# Patient Record
Sex: FEMALE | Race: WHITE | HISPANIC OR LATINO | Employment: UNEMPLOYED | URBAN - METROPOLITAN AREA
[De-identification: names, ages, dates, MRNs, and addresses within clinical notes are randomized per-mention and may not be internally consistent; named-entity substitution may affect disease eponyms.]

---

## 2017-11-24 ENCOUNTER — CONVERSION ENCOUNTER (OUTPATIENT)
Dept: MAMMOGRAPHY | Facility: CLINIC | Age: 57
End: 2017-11-24

## 2018-08-23 PROBLEM — K76.0 STEATOSIS OF LIVER: Status: ACTIVE | Noted: 2017-12-13

## 2018-08-27 ENCOUNTER — OFFICE VISIT (OUTPATIENT)
Dept: FAMILY MEDICINE CLINIC | Facility: CLINIC | Age: 58
End: 2018-08-27
Payer: COMMERCIAL

## 2018-08-27 VITALS
OXYGEN SATURATION: 96 % | HEART RATE: 79 BPM | TEMPERATURE: 99.3 F | SYSTOLIC BLOOD PRESSURE: 110 MMHG | HEIGHT: 62 IN | WEIGHT: 233 LBS | BODY MASS INDEX: 42.88 KG/M2 | RESPIRATION RATE: 16 BRPM | DIASTOLIC BLOOD PRESSURE: 70 MMHG

## 2018-08-27 DIAGNOSIS — R73.01 IMPAIRED FASTING GLUCOSE: ICD-10-CM
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
## 2018-08-27 DIAGNOSIS — E03.9 ACQUIRED HYPOTHYROIDISM: ICD-10-CM

## 2018-08-27 DIAGNOSIS — M25.561 ACUTE PAIN OF BOTH KNEES: ICD-10-CM

## 2018-08-27 DIAGNOSIS — M25.562 ACUTE PAIN OF BOTH KNEES: ICD-10-CM

## 2018-08-27 DIAGNOSIS — E55.9 VITAMIN D DEFICIENCY: ICD-10-CM

## 2018-08-27 DIAGNOSIS — M54.50 ACUTE MIDLINE LOW BACK PAIN WITHOUT SCIATICA: ICD-10-CM

## 2018-08-27 DIAGNOSIS — I10 ESSENTIAL HYPERTENSION: ICD-10-CM

## 2018-08-27 PROCEDURE — 99214 OFFICE O/P EST MOD 30 MIN: CPT | Performed by: FAMILY MEDICINE

## 2018-08-27 RX ORDER — MONTELUKAST SODIUM 10 MG/1
TABLET ORAL
COMMUNITY
Start: 2016-03-15 | End: 2019-02-21 | Stop reason: SDUPTHER

## 2018-08-27 RX ORDER — FUROSEMIDE 20 MG/1
20 TABLET ORAL DAILY
Refills: 1 | COMMUNITY
Start: 2018-05-20 | End: 2019-02-21 | Stop reason: SDUPTHER

## 2018-08-27 RX ORDER — CITALOPRAM 10 MG/1
TABLET ORAL
COMMUNITY
Start: 2016-03-28 | End: 2019-06-17 | Stop reason: SDUPTHER

## 2018-08-27 RX ORDER — FLUTICASONE PROPIONATE 50 MCG
SPRAY, SUSPENSION (ML) NASAL AS NEEDED
COMMUNITY
Start: 2016-03-31

## 2018-08-27 RX ORDER — ATORVASTATIN CALCIUM 20 MG/1
TABLET, FILM COATED ORAL
COMMUNITY
Start: 2016-03-21 | End: 2019-03-28 | Stop reason: SDUPTHER

## 2018-08-27 RX ORDER — LEVOTHYROXINE SODIUM 112 UG/1
TABLET ORAL
COMMUNITY
Start: 2011-07-18 | End: 2019-05-20 | Stop reason: SDUPTHER

## 2018-08-27 RX ORDER — FEXOFENADINE HCL 180 MG/1
TABLET ORAL
COMMUNITY
Start: 2015-09-28

## 2018-08-27 RX ORDER — AMLODIPINE BESYLATE AND OLMESARTAN MEDOXOMIL 5; 40 MG/1; MG/1
1 TABLET, FILM COATED ORAL DAILY
Refills: 1 | COMMUNITY
Start: 2018-05-26 | End: 2019-03-19 | Stop reason: SDUPTHER

## 2018-08-27 NOTE — PROGRESS NOTES
Assessment/Plan:    No problem-specific Assessment & Plan notes found for this encounter  Diagnoses and all orders for this visit:    Fall, initial encounter  Comments:  Mechanical fall the plan for CT scan of the brain without contrast discussed with the patient  Orders:  -     CBC and differential; Future  -     Comprehensive metabolic panel; Future  -     Hemoglobin A1C; Future  -     Lipid Panel with Direct LDL reflex; Future  -     TSH, 3rd generation with Free T4 reflex; Future  -     Vitamin D 25 hydroxy; Future  -     CT head wo contrast; Future    Acute midline low back pain without sciatica  Comments:  Status post fall plan for x-ray of the lumbar spine a Tylenol for the pain and we discussed with the patient important lose weight improper postural  Orders:  -     CBC and differential; Future  -     Comprehensive metabolic panel; Future  -     Hemoglobin A1C; Future  -     Lipid Panel with Direct LDL reflex; Future  -     TSH, 3rd generation with Free T4 reflex; Future  -     Vitamin D 25 hydroxy; Future  -     XR spine lumbar 2 or 3 views injury; Future    Acute pain of both knees  Comments:  S/P fall ,plan for Xray of B/L knee ,Tylenol for pain ,disucss importasnt lose weight  Orders:  -     CBC and differential; Future  -     Comprehensive metabolic panel; Future  -     Hemoglobin A1C; Future  -     Lipid Panel with Direct LDL reflex; Future  -     TSH, 3rd generation with Free T4 reflex; Future  -     Vitamin D 25 hydroxy; Future  -     XR knee 4+ vw right injury; Future  -     XR knee 4+ vw left injury; Future    Acquired hypothyroidism  -     CBC and differential; Future  -     Comprehensive metabolic panel; Future  -     Hemoglobin A1C; Future  -     Lipid Panel with Direct LDL reflex; Future  -     TSH, 3rd generation with Free T4 reflex; Future  -     Vitamin D 25 hydroxy; Future    Impaired fasting glucose  -     CBC and differential; Future  -     Comprehensive metabolic panel;  Future  - Hemoglobin A1C; Future  -     Lipid Panel with Direct LDL reflex; Future  -     TSH, 3rd generation with Free T4 reflex; Future  -     Vitamin D 25 hydroxy; Future    Essential hypertension  -     CBC and differential; Future  -     Comprehensive metabolic panel; Future  -     Hemoglobin A1C; Future  -     Lipid Panel with Direct LDL reflex; Future  -     TSH, 3rd generation with Free T4 reflex; Future  -     Vitamin D 25 hydroxy; Future    Vitamin D deficiency  -     Vitamin D 25 hydroxy; Future    Other orders  -     fexofenadine (ALLEGRA ALLERGY) 180 MG tablet; take 1 tablet (180MG)  by oral route  every day  -     ROSANA 5-40 MG; Take 1 tablet by mouth daily  -     atorvastatin (LIPITOR) 20 mg tablet; TAKE 1 TABLET BY MOUTH EVERY DAY  -     citalopram (CeleXA) 10 mg tablet; TAKE 1 TABLET BY MOUTH EVERY EVENING  -     fluticasone (FLONASE) 50 mcg/act nasal spray; as needed  -     furosemide (LASIX) 20 mg tablet; Take 20 mg by mouth daily  -     levothyroxine 112 mcg tablet;   -     montelukast (SINGULAIR) 10 mg tablet; TAKE 1 TABLET IN THE EVENING          Subjective:   Chief Complaint   Patient presents with    Follow-up     chronic conditions     Fall     4 days ago         Patient ID: Mervat Boudreaux is a 65 y o  female      The 4 days ago patient was coming back from [place] and in the airport she tripped and fell forward she had her knee on floor , the per patient she does not recall if she hit her head or not and the but she have a since then she had pain all over her body on her back and her knees and her lower extremity and she feel her she has a week and the tired all the time on and off headache no blurred vision no weakness no lateralized of the symptom and no numbness that she did had some swelling and discoloration and bilateral knee no vomiting she has been taking some Advil over-the-counter for her pain        The following portions of the patient's history were reviewed and updated as appropriate: allergies, current medications, past family history, past medical history, past social history, past surgical history and problem list     Review of Systems   Constitutional: Negative for fatigue and fever  HENT: Negative for ear pain, sinus pain, sinus pressure and sore throat  Eyes: Negative for pain and redness  Respiratory: Negative for cough, chest tightness and shortness of breath  Cardiovascular: Negative for chest pain, palpitations and leg swelling  Gastrointestinal: Negative for abdominal pain, blood in stool, constipation, diarrhea and nausea  Genitourinary: Negative for flank pain, frequency and hematuria  Musculoskeletal: Positive for arthralgias and back pain  Negative for joint swelling  Skin: Negative for rash  Neurological: Positive for headaches  Negative for dizziness and numbness  Hematological: Does not bruise/bleed easily  Objective:  Vitals:    08/27/18 1002   BP: 110/70   BP Location: Left arm   Patient Position: Sitting   Cuff Size: Large   Pulse: 79   Resp: 16   Temp: 99 3 °F (37 4 °C)   TempSrc: Oral   SpO2: 96%   Weight: 106 kg (233 lb)   Height: 5' 2" (1 575 m)      Physical Exam   Constitutional: She is oriented to person, place, and time  She appears well-developed and well-nourished  HENT:   Head: Normocephalic  Right Ear: External ear normal    Left Ear: External ear normal    Eyes: Conjunctivae and EOM are normal  Right eye exhibits no discharge  Left eye exhibits no discharge  Neck: No JVD present  Cardiovascular: Normal rate, regular rhythm and normal heart sounds  Exam reveals no gallop  No murmur heard  Pulmonary/Chest: Effort normal  No respiratory distress  She has no wheezes  She has no rales  She exhibits no tenderness  Abdominal: She exhibits no mass  There is no tenderness  There is no rebound  Musculoskeletal: She exhibits tenderness  She exhibits no edema  Neurological: She is alert and oriented to person, place, and time  Skin: No rash noted  No erythema

## 2018-09-04 ENCOUNTER — HOSPITAL ENCOUNTER (OUTPATIENT)
Dept: CT IMAGING | Facility: HOSPITAL | Age: 58
Discharge: HOME | End: 2018-09-04
Payer: COMMERCIAL

## 2018-09-04 ENCOUNTER — HOSPITAL ENCOUNTER (OUTPATIENT)
Dept: RADIOLOGY | Facility: HOSPITAL | Age: 58
Discharge: HOME | End: 2018-09-04
Payer: COMMERCIAL
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
## 2018-09-04 DIAGNOSIS — M54.50 ACUTE MIDLINE LOW BACK PAIN WITHOUT SCIATICA: ICD-10-CM

## 2018-09-04 DIAGNOSIS — M25.561 ACUTE PAIN OF BOTH KNEES: ICD-10-CM

## 2018-09-04 DIAGNOSIS — M25.562 ACUTE PAIN OF BOTH KNEES: ICD-10-CM

## 2018-09-04 PROCEDURE — 73564 X-RAY EXAM KNEE 4 OR MORE: CPT

## 2018-09-04 PROCEDURE — 70450 CT HEAD/BRAIN W/O DYE: CPT

## 2018-09-04 PROCEDURE — 72110 X-RAY EXAM L-2 SPINE 4/>VWS: CPT

## 2018-09-18 ENCOUNTER — OFFICE VISIT (OUTPATIENT)
Dept: FAMILY MEDICINE CLINIC | Facility: CLINIC | Age: 58
End: 2018-09-18
Payer: COMMERCIAL

## 2018-09-18 VITALS
HEART RATE: 82 BPM | TEMPERATURE: 99.2 F | OXYGEN SATURATION: 95 % | WEIGHT: 235 LBS | SYSTOLIC BLOOD PRESSURE: 124 MMHG | BODY MASS INDEX: 43.24 KG/M2 | DIASTOLIC BLOOD PRESSURE: 80 MMHG | HEIGHT: 62 IN

## 2018-09-18 DIAGNOSIS — E03.9 ACQUIRED HYPOTHYROIDISM: Primary | ICD-10-CM

## 2018-09-18 DIAGNOSIS — R74.8 ALKALINE PHOSPHATASE ELEVATION: ICD-10-CM

## 2018-09-18 DIAGNOSIS — E55.9 VITAMIN D DEFICIENCY: ICD-10-CM

## 2018-09-18 DIAGNOSIS — R73.01 IMPAIRED FASTING GLUCOSE: ICD-10-CM

## 2018-09-18 DIAGNOSIS — M54.42 CHRONIC BILATERAL LOW BACK PAIN WITH BILATERAL SCIATICA: ICD-10-CM

## 2018-09-18 DIAGNOSIS — E78.2 MIXED HYPERLIPIDEMIA: ICD-10-CM

## 2018-09-18 DIAGNOSIS — I10 ESSENTIAL HYPERTENSION: ICD-10-CM

## 2018-09-18 DIAGNOSIS — M54.41 CHRONIC BILATERAL LOW BACK PAIN WITH BILATERAL SCIATICA: ICD-10-CM

## 2018-09-18 DIAGNOSIS — G89.29 CHRONIC BILATERAL LOW BACK PAIN WITH BILATERAL SCIATICA: ICD-10-CM

## 2018-09-18 DIAGNOSIS — K21.9 GASTROESOPHAGEAL REFLUX DISEASE WITHOUT ESOPHAGITIS: ICD-10-CM

## 2018-09-18 PROCEDURE — 3074F SYST BP LT 130 MM HG: CPT | Performed by: FAMILY MEDICINE

## 2018-09-18 PROCEDURE — 99214 OFFICE O/P EST MOD 30 MIN: CPT | Performed by: FAMILY MEDICINE

## 2018-09-18 PROCEDURE — 3079F DIAST BP 80-89 MM HG: CPT | Performed by: FAMILY MEDICINE

## 2018-09-18 PROCEDURE — 3008F BODY MASS INDEX DOCD: CPT | Performed by: FAMILY MEDICINE

## 2018-09-18 RX ORDER — IBUPROFEN 600 MG/1
600 TABLET ORAL EVERY 8 HOURS PRN
Qty: 30 TABLET | Refills: 0 | Status: SHIPPED | OUTPATIENT
Start: 2018-09-18 | End: 2018-10-19 | Stop reason: SDUPTHER

## 2018-09-18 RX ORDER — ERGOCALCIFEROL 1.25 MG/1
50000 CAPSULE ORAL WEEKLY
Qty: 4 CAPSULE | Refills: 2 | Status: SHIPPED | OUTPATIENT
Start: 2018-09-18 | End: 2019-01-09 | Stop reason: ALTCHOICE

## 2018-09-18 RX ORDER — PANTOPRAZOLE SODIUM 40 MG/1
40 TABLET, DELAYED RELEASE ORAL DAILY
Qty: 30 TABLET | Refills: 1 | Status: SHIPPED | OUTPATIENT
Start: 2018-09-18 | End: 2018-11-05 | Stop reason: SDUPTHER

## 2018-09-18 NOTE — PATIENT INSTRUCTIONS
Gastroesophageal Reflux Disease   AMBULATORY CARE:   Gastroesophageal reflux  reflux occurs when acid and food in the stomach back up into the esophagus  Gastroesophageal reflux disease (GERD) is reflux that occurs more than twice a week for a few weeks  It usually causes heartburn and other symptoms  GERD can cause other health problems over time if it is not treated  Common symptoms include:  Heartburn is the most common symptom of GERD  You may feel burning pain in your chest or below the breast bone  This usually occurs after meals and spreads to your neck, jaw, or shoulder  The pain gets better when you change positions  You may also have any of the following:  · Bitter or acid taste in your mouth    · Dry cough    · Trouble swallowing or pain with swallowing    · Hoarseness or sore throat    · Frequent burping or hiccups    · Feeling of fullness soon after you start eating  Seek care immediately if:  · You feel full and cannot burp or vomit  · You have severe chest pain and sudden trouble breathing  · Your bowel movements are black, bloody, or tarry-looking  · Your vomit looks like coffee grounds or has blood in it  Contact your healthcare provider if:   · You vomit large amounts, or you vomit often  · You have trouble breathing after you vomit  · You have trouble swallowing, or pain with swallowing  · You are losing weight without trying  · Your symptoms get worse or do not improve with treatment  · You have questions or concerns about your condition or care  Treatment for GERD:  Your healthcare provider may prescribe medicine to decrease stomach acid  He may also prescribe medicine that help your esophagus and stomach move food and liquid to your intestines  Surgery may be done if other treatments do not work  You may need surgery to wrap the upper part of the stomach around the esophageal sphincter  This will strengthen the sphincter and prevent reflux     Manage GERD: · Do not have foods or drinks that may increase heartburn  These include chocolate, peppermint, fried or fatty foods, drinks that contain caffeine, or carbonated drinks (soda)  Other foods include spicy foods, onions, tomatoes, and tomato-based foods  Do not have foods or drinks that can irritate your esophagus, such as citrus fruits, juices, and alcohol  · Do not eat large meals  When you eat a lot of food at one time, your stomach needs more acid to digest it  Eat 6 small meals each day instead of 3 large ones, and eat slowly  Do not eat meals 2 to 3 hours before bedtime  · Elevate the head of your bed  Place 6-inch blocks under the head of your bed frame  You may also use more than one pillow under your head and shoulders while you sleep  · Maintain a healthy weight  If you are overweight, weight loss may help relieve symptoms of GERD  · Do not smoke  Smoking weakens the lower esophageal sphincter and increases the risk of GERD  Ask your healthcare provider for information if you currently smoke and need help to quit  E-cigarettes or smokeless tobacco still contain nicotine  Talk to your healthcare provider before you use these products  · Do not wear clothing that is tight around your waist   Tight clothing can put pressure on your stomach and cause or worsen GERD symptoms  Follow up with your healthcare provider as directed:  Write down your questions so you remember to ask them during your visits  © 2017 [employer] Information is for End User's use only and may not be sold, redistributed or otherwise used for commercial purposes  All illustrations and images included in CareNotes® are the copyrighted property of [employer]  or [employer]  The above information is an  only  It is not intended as medical advice for individual conditions or treatments   Talk to your doctor, nurse or pharmacist before following any medical regimen to see if it is safe and effective for you

## 2018-09-20 NOTE — ASSESSMENT & PLAN NOTE
The elevated again phosphatase weaken recheck LFT within do ultrasound of the liver will refer the patient to see GI

## 2018-09-20 NOTE — PROGRESS NOTES
Assessment/Plan:    Gastroesophageal reflux disease  Chronic ,well controlled by Pantoprazole  Discuss with pt important lose weight   Multiple small meal ,avoid eat and lying down ,avoid spicy food and avoid provoked food        Hypothyroidism  Chronic fair controlled with the current dose of levothyroxine 112 mcg once a day proper use of medication discussed with the patient    Essential hypertension  Chronic with controlled continue current medication low-salt diet less than 2 g a day ,   low caffeine intake   regular aerobic exercise 20 to totally minute a day diet and important lose weight discussed with the patient      Impaired fasting glucose  Chronic for control with the low carb diet encouraged patient to continue low carb diet  Discussed with the patient important lose weight    Mixed hyperlipidemia  Chronic ,fair control on current medication  Advised to maintain a low-fat low-cholesterol diet consult regarding potential comorbidity including cardiovascular disease consult regarding important weight loss      Vitamin D deficiency  Uncontrolled start vitamin-D 13415 International Units once a week for 12 week proper use of medication possible side effect discussed with the patient    Alkaline phosphatase elevation  The elevated again phosphatase weaken recheck LFT within do ultrasound of the liver will refer the patient to see GI       Diagnoses and all orders for this visit:    Acquired hypothyroidism    Impaired fasting glucose    Essential hypertension    Mixed hyperlipidemia    Vitamin D deficiency  -     ergocalciferol (VITAMIN D2) 50,000 units; Take 1 capsule (50,000 Units total) by mouth once a week    Gastroesophageal reflux disease without esophagitis  -     pantoprazole (PROTONIX) 40 mg tablet; Take 1 tablet (40 mg total) by mouth daily    Alkaline phosphatase elevation  -     US liver; Future  -     Ambulatory referral to Gastroenterology;  Future    Chronic bilateral low back pain with bilateral sciatica  -     ibuprofen (MOTRIN) 600 mg tablet; Take 1 tablet (600 mg total) by mouth every 8 (eight) hours as needed for mild pain          Subjective:   Chief Complaint   Patient presents with    Follow-up     chronic conditions        Patient ID: Emely Crespo is a 63 y o  female  Patient here follow-up with a chronic condition The patient has long history of hypertension the blood pressure today is in control patient tolerates medication well and no chest pain or short of breath no palpitation no headache patient's history of hypothyroidism asymptomatic tolerated current dose of levothyroxine 112 mcg patient's history of hyperlipidemia on statin tolerated well without any side effect also patient was history of for GERD on pantoprazole 40 mg once a day patient feel and still symptomatic bloating after eating any kind of food with heartburn but there is no vomiting no diarrhea no weight change patient has been taking the PPI for a long time  Vitamin-D deficiency is uncontrolled asymptomatic  Her recent blood work discussed with the patient show her upcoming phosphatases elevated        The following portions of the patient's history were reviewed and updated as appropriate: allergies, current medications, past family history, past medical history, past social history, past surgical history and problem list     Review of Systems   Constitutional: Negative for fatigue and fever  HENT: Negative for ear pain, sinus pain, sinus pressure and sore throat  Eyes: Negative for pain and redness  Respiratory: Negative for cough, chest tightness and shortness of breath  Cardiovascular: Negative for chest pain, palpitations and leg swelling  Gastrointestinal: Negative for abdominal pain, blood in stool, constipation, diarrhea and nausea  Genitourinary: Negative for flank pain, frequency and hematuria  Musculoskeletal: Negative for back pain and joint swelling  Skin: Negative for rash  Neurological: Negative for dizziness, numbness and headaches  Hematological: Does not bruise/bleed easily  Objective:  Vitals:    09/18/18 1113   BP: 124/80   Pulse: 82   Temp: 99 2 °F (37 3 °C)   TempSrc: Oral   SpO2: 95%   Weight: 107 kg (235 lb)   Height: 5' 2 25" (1 581 m)      Physical Exam   Constitutional: She is oriented to person, place, and time  She appears well-developed and well-nourished  HENT:   Head: Normocephalic  Right Ear: External ear normal    Left Ear: External ear normal    Eyes: Conjunctivae and EOM are normal  Right eye exhibits no discharge  Left eye exhibits no discharge  Neck: No JVD present  Cardiovascular: Normal rate and regular rhythm  Exam reveals no gallop  Murmur heard  Pulmonary/Chest: Effort normal  No respiratory distress  She has no wheezes  She has no rales  She exhibits no tenderness  Abdominal: She exhibits no mass  There is no tenderness  There is no rebound  Musculoskeletal: She exhibits no edema or tenderness  Neurological: She is alert and oriented to person, place, and time  Skin: No rash noted  No erythema

## 2018-09-20 NOTE — ASSESSMENT & PLAN NOTE
Chronic fair controlled with the current dose of levothyroxine 112 mcg once a day proper use of medication discussed with the patient

## 2018-09-20 NOTE — ASSESSMENT & PLAN NOTE
Uncontrolled start vitamin-D 12798 International Units once a week for 12 week proper use of medication possible side effect discussed with the patient

## 2018-09-20 NOTE — ASSESSMENT & PLAN NOTE
Chronic for control with the low carb diet encouraged patient to continue low carb diet  Discussed with the patient important lose weight

## 2018-09-24 ENCOUNTER — HOSPITAL ENCOUNTER (OUTPATIENT)
Dept: ULTRASOUND IMAGING | Facility: HOSPITAL | Age: 58
Discharge: HOME | End: 2018-09-24
Payer: COMMERCIAL

## 2018-09-24 DIAGNOSIS — R74.8 ALKALINE PHOSPHATASE ELEVATION: ICD-10-CM

## 2018-09-24 PROCEDURE — 76705 ECHO EXAM OF ABDOMEN: CPT

## 2018-10-19 ENCOUNTER — OFFICE VISIT (OUTPATIENT)
Dept: FAMILY MEDICINE CLINIC | Facility: CLINIC | Age: 58
End: 2018-10-19
Payer: COMMERCIAL

## 2018-10-19 VITALS
HEART RATE: 74 BPM | BODY MASS INDEX: 43.61 KG/M2 | TEMPERATURE: 98.1 F | DIASTOLIC BLOOD PRESSURE: 88 MMHG | OXYGEN SATURATION: 95 % | WEIGHT: 237 LBS | SYSTOLIC BLOOD PRESSURE: 142 MMHG | HEIGHT: 62 IN | RESPIRATION RATE: 16 BRPM

## 2018-10-19 DIAGNOSIS — K76.0 FATTY LIVER DISEASE, NONALCOHOLIC: Primary | ICD-10-CM

## 2018-10-19 DIAGNOSIS — G89.29 CHRONIC BILATERAL LOW BACK PAIN WITH BILATERAL SCIATICA: ICD-10-CM

## 2018-10-19 DIAGNOSIS — M54.42 CHRONIC BILATERAL LOW BACK PAIN WITH BILATERAL SCIATICA: ICD-10-CM

## 2018-10-19 DIAGNOSIS — Z23 NEED FOR INFLUENZA VACCINATION: ICD-10-CM

## 2018-10-19 DIAGNOSIS — E66.01 MORBID OBESITY (HCC): ICD-10-CM

## 2018-10-19 DIAGNOSIS — M54.41 CHRONIC BILATERAL LOW BACK PAIN WITH BILATERAL SCIATICA: ICD-10-CM

## 2018-10-19 PROCEDURE — 99213 OFFICE O/P EST LOW 20 MIN: CPT | Performed by: FAMILY MEDICINE

## 2018-10-19 PROCEDURE — 3008F BODY MASS INDEX DOCD: CPT | Performed by: FAMILY MEDICINE

## 2018-10-19 RX ORDER — IBUPROFEN 600 MG/1
600 TABLET ORAL EVERY 8 HOURS PRN
Qty: 30 TABLET | Refills: 0 | Status: SHIPPED | OUTPATIENT
Start: 2018-10-19 | End: 2019-01-09 | Stop reason: SDUPTHER

## 2018-10-19 NOTE — PROGRESS NOTES
Subjective:   Chief Complaint   Patient presents with    Follow-up     review Ultrasound of the liver         Patient ID: Tracy Phillips is a 63 y o  female  Patient he discussed ultrasound of the liver, which showed she had a fatty liver result discussed with the patient discussed with the also the nodule of the problem and important lose weight  Patient having problem with her weight is she is in and morbid obesity category patient has been trying to lose weight with multiple trial of diet and she was not successful patient does not do exercise regularly secondary to some back pain deny any and skin discoloration no muscle atrophy no excessive hair growth no sweating no fatigue no sleep disorder        The following portions of the patient's history were reviewed and updated as appropriate: allergies, current medications, past family history, past medical history, past social history, past surgical history and problem list     Review of Systems   Constitutional: Negative for fatigue and fever  HENT: Negative for ear pain, sinus pain, sinus pressure and sore throat  Eyes: Negative for pain and redness  Respiratory: Negative for cough, chest tightness and shortness of breath  Cardiovascular: Negative for chest pain, palpitations and leg swelling  Gastrointestinal: Negative for abdominal pain, blood in stool, constipation, diarrhea and nausea  Genitourinary: Negative for flank pain, frequency and hematuria  Musculoskeletal: Negative for back pain and joint swelling  Skin: Negative for rash  Neurological: Negative for dizziness, numbness and headaches  Hematological: Does not bruise/bleed easily  Psychiatric/Behavioral: Negative for agitation           Objective:  Vitals:    10/19/18 0954   BP: 142/88   BP Location: Left arm   Patient Position: Sitting   Cuff Size: Large   Pulse: 74   Resp: 16   Temp: 98 1 °F (36 7 °C)   TempSrc: Oral   SpO2: 95%   Weight: 108 kg (237 lb)   Height: 5' 2 25" (1 581 m)      Physical Exam   Constitutional: She is oriented to person, place, and time  She appears well-developed and well-nourished  HENT:   Head: Normocephalic  Right Ear: External ear normal    Left Ear: External ear normal    Eyes: Conjunctivae and EOM are normal  Right eye exhibits no discharge  Left eye exhibits no discharge  Neck: No JVD present  Cardiovascular: Normal rate, regular rhythm and normal heart sounds  Exam reveals no gallop  No murmur heard  Pulmonary/Chest: Effort normal  No respiratory distress  She has no wheezes  She has no rales  She exhibits no tenderness  Abdominal: She exhibits no mass  There is no tenderness  There is no rebound  Musculoskeletal: She exhibits no edema or tenderness  Neurological: She is alert and oriented to person, place, and time  Skin: No rash noted  No erythema  Assessment/Plan:    Fatty liver disease, nonalcoholic  Uncontrolled we discussed the patient important control her her sugar and important lose 10% of her weight patient already referred to a GI her appointment with them and November    Morbid obesity ([address] )  Uncontrolled we discussed with the patient refer to bariatric team and she decline also she declined refer to nutritionist       Diagnoses and all orders for this visit:    Fatty liver disease, nonalcoholic  -     CBC and differential; Future  -     Comprehensive metabolic panel; Future  -     Lipid panel; Future  -     TSH, 3rd generation; Future    Morbid obesity ([facility])  -     CBC and differential; Future  -     Comprehensive metabolic panel; Future  -     Lipid panel; Future  -     TSH, 3rd generation; Future    Chronic bilateral low back pain with bilateral sciatica  -     ibuprofen (MOTRIN) 600 mg tablet;  Take 1 tablet (600 mg total) by mouth every 8 (eight) hours as needed for mild pain    Need for influenza vaccination  -     influenza vaccine, [id number], quadrivalent, recombinant, PF, 0 5 mL, for patients 18 yr+ (FLUBLOK)

## 2018-10-21 PROBLEM — E66.01 MORBID OBESITY (HCC): Status: ACTIVE | Noted: 2018-10-21

## 2018-10-21 NOTE — ASSESSMENT & PLAN NOTE
Uncontrolled we discussed with the patient refer to bariatric team and she decline also she declined refer to nutritionist

## 2018-10-21 NOTE — ASSESSMENT & PLAN NOTE
Uncontrolled we discussed the patient important control her her sugar and important lose 10% of her weight patient already referred to a GI her appointment with them and November

## 2018-11-05 DIAGNOSIS — K21.9 GASTROESOPHAGEAL REFLUX DISEASE WITHOUT ESOPHAGITIS: ICD-10-CM

## 2018-11-05 RX ORDER — PANTOPRAZOLE SODIUM 40 MG/1
40 TABLET, DELAYED RELEASE ORAL DAILY
Qty: 90 TABLET | Refills: 0 | Status: SHIPPED | OUTPATIENT
Start: 2018-11-05 | End: 2019-02-11 | Stop reason: SDUPTHER

## 2018-11-23 ENCOUNTER — OFFICE VISIT (OUTPATIENT)
Dept: GASTROENTEROLOGY | Facility: MEDICAL CENTER | Age: 58
End: 2018-11-23
Payer: COMMERCIAL

## 2018-11-23 VITALS
BODY MASS INDEX: 43.43 KG/M2 | HEART RATE: 78 BPM | SYSTOLIC BLOOD PRESSURE: 124 MMHG | WEIGHT: 236 LBS | DIASTOLIC BLOOD PRESSURE: 76 MMHG | HEIGHT: 62 IN | TEMPERATURE: 98 F

## 2018-11-23 DIAGNOSIS — K59.04 CHRONIC IDIOPATHIC CONSTIPATION: ICD-10-CM

## 2018-11-23 DIAGNOSIS — R14.0 BLOATING: ICD-10-CM

## 2018-11-23 DIAGNOSIS — K76.0 FATTY LIVER DISEASE, NONALCOHOLIC: Primary | ICD-10-CM

## 2018-11-23 DIAGNOSIS — R74.8 ALKALINE PHOSPHATASE ELEVATION: ICD-10-CM

## 2018-11-23 PROCEDURE — 99211 OFF/OP EST MAY X REQ PHY/QHP: CPT | Performed by: INTERNAL MEDICINE

## 2018-11-23 NOTE — PATIENT INSTRUCTIONS
You have been prescribed Linzess (linaclotide) for treatment of your constipation +/- abdominal pain  Rishi Hunter comes in 3 dosages: 72 mcg, 145 mcg, and 290 mcg daily  I have prescribed the 145 mcg daily dosing for you and sent it to your pharmacy  I would like you to start this medication and take it daily for up to 1 week  If you bowel movements become more regular, great! Continue this dose and give me a call for a refill of this dose  If you have diarrhea with this dose, cut down to taking 1 pill (145 mcg) every other day  This means you would do well with the 72 mcg daily dosing  Give me a call to prescribe this dose  If you have continued constipation/abdominal pain with the 145 mcg daily dosing as prescribed, take 2 tabs per day (145 mcg x 2 = 290 mcg)  Give me a call to prescribe this dose

## 2018-12-06 ENCOUNTER — TELEPHONE (OUTPATIENT)
Dept: GASTROENTEROLOGY | Facility: AMBULARY SURGERY CENTER | Age: 58
End: 2018-12-06

## 2018-12-06 ENCOUNTER — TELEPHONE (OUTPATIENT)
Dept: GASTROENTEROLOGY | Facility: MEDICAL CENTER | Age: 58
End: 2018-12-06

## 2018-12-06 NOTE — PROGRESS NOTES
Please let her know her testing for chronic causes of liver disease are all normal/negative  This confirms diagnosis of fatty liver disease  She is recommended to lose weight, goal 10% of total body weight to potentially reverse changes

## 2018-12-06 NOTE — TELEPHONE ENCOUNTER
----- Message from Gordon Snider MD sent at 12/6/2018 12:48 PM EST -----      ----- Message -----  From: Emile Reese MA  Sent: 12/6/2018  11:42 AM  To: Gordon Snider MD        ----- Message -----  From: Joe Motley  Sent: 12/6/2018  11:22 AM  To: Gastroenterology [facility]

## 2019-01-09 ENCOUNTER — OFFICE VISIT (OUTPATIENT)
Dept: FAMILY MEDICINE CLINIC | Facility: CLINIC | Age: 59
End: 2019-01-09
Payer: COMMERCIAL

## 2019-01-09 VITALS
TEMPERATURE: 98.4 F | WEIGHT: 237 LBS | HEART RATE: 81 BPM | BODY MASS INDEX: 41.99 KG/M2 | HEIGHT: 63 IN | SYSTOLIC BLOOD PRESSURE: 120 MMHG | OXYGEN SATURATION: 96 % | DIASTOLIC BLOOD PRESSURE: 70 MMHG

## 2019-01-09 DIAGNOSIS — M79.604 PAIN IN BOTH LOWER EXTREMITIES: Primary | ICD-10-CM

## 2019-01-09 DIAGNOSIS — H92.01 RIGHT EAR PAIN: ICD-10-CM

## 2019-01-09 DIAGNOSIS — E03.9 HYPOTHYROIDISM, UNSPECIFIED TYPE: ICD-10-CM

## 2019-01-09 DIAGNOSIS — M79.605 PAIN IN BOTH LOWER EXTREMITIES: Primary | ICD-10-CM

## 2019-01-09 DIAGNOSIS — M54.42 CHRONIC BILATERAL LOW BACK PAIN WITH BILATERAL SCIATICA: ICD-10-CM

## 2019-01-09 DIAGNOSIS — Z12.31 SCREENING MAMMOGRAM, ENCOUNTER FOR: ICD-10-CM

## 2019-01-09 DIAGNOSIS — G89.29 CHRONIC BILATERAL LOW BACK PAIN WITH BILATERAL SCIATICA: ICD-10-CM

## 2019-01-09 DIAGNOSIS — M54.41 CHRONIC BILATERAL LOW BACK PAIN WITH BILATERAL SCIATICA: ICD-10-CM

## 2019-01-09 DIAGNOSIS — R73.01 IFG (IMPAIRED FASTING GLUCOSE): ICD-10-CM

## 2019-01-09 DIAGNOSIS — Z23 NEED FOR INFLUENZA VACCINATION: ICD-10-CM

## 2019-01-09 DIAGNOSIS — Z11.59 NEED FOR HEPATITIS C SCREENING TEST: ICD-10-CM

## 2019-01-09 PROCEDURE — 90686 IIV4 VACC NO PRSV 0.5 ML IM: CPT | Performed by: FAMILY MEDICINE

## 2019-01-09 PROCEDURE — 99214 OFFICE O/P EST MOD 30 MIN: CPT | Performed by: FAMILY MEDICINE

## 2019-01-09 PROCEDURE — 90471 IMMUNIZATION ADMIN: CPT | Performed by: FAMILY MEDICINE

## 2019-01-09 RX ORDER — GABAPENTIN 100 MG/1
100 CAPSULE ORAL
Qty: 30 CAPSULE | Refills: 2 | Status: SHIPPED | OUTPATIENT
Start: 2019-01-09 | End: 2019-03-15 | Stop reason: SDUPTHER

## 2019-01-09 RX ORDER — IBUPROFEN 600 MG/1
600 TABLET ORAL EVERY 8 HOURS PRN
Qty: 30 TABLET | Refills: 0 | Status: SHIPPED | OUTPATIENT
Start: 2019-01-09 | End: 2019-08-26 | Stop reason: ALTCHOICE

## 2019-01-09 NOTE — PROGRESS NOTES
Subjective:   Chief Complaint   Patient presents with    Other     bilateral leg pain    Earache     right        Patient ID: Orin Vilchis is a 63 y o  female  Patient and office concerned about the pain in her bilateral legs and it has been going on now for a while and the pain coming from her hip it down she describe it as a heavy legs and sometimes numbness and tingling and no fall no trauma no muscle atrophy no rash patient does have history of for chronic back pain and previous x-ray on September 2018 it show degenerative disc disease at the lumbar spine patient deny any fever no lose control of the urine or stool deny any headache no dizziness no blurred vision no difficulty swallowing no head trauma   Also patient concerned about the ear pain in the right side start yesterday no fever no decreased hearing and no headache no facial pain patient known to have history of sinus problem she does have postnasal drip and runny nose        The following portions of the patient's history were reviewed and updated as appropriate: allergies, current medications, past family history, past medical history, past social history, past surgical history and problem list     Review of Systems   Constitutional: Negative for fatigue and fever  HENT: Negative for ear pain, sinus pain, sinus pressure and sore throat  Eyes: Negative for pain and redness  Respiratory: Negative for cough, chest tightness and shortness of breath  Cardiovascular: Negative for chest pain, palpitations and leg swelling  Gastrointestinal: Negative for abdominal pain, blood in stool, constipation, diarrhea and nausea  Genitourinary: Negative for flank pain, frequency and hematuria  Musculoskeletal: Positive for back pain  Negative for joint swelling  Leg pain and heaviness   Skin: Negative for rash  Neurological: Negative for dizziness, numbness and headaches  Hematological: Does not bruise/bleed easily  Objective:  Vitals:    01/09/19 1030   BP: 120/70   Pulse: 81   Temp: 98 4 °F (36 9 °C)   TempSrc: Oral   SpO2: 96%   Weight: 108 kg (237 lb)   Height: 5' 2 5" (1 588 m)      Physical Exam   Constitutional: She is oriented to person, place, and time  She appears well-developed and well-nourished  HENT:   Head: Normocephalic  Right Ear: External ear normal    Left Ear: External ear normal    Eyes: Conjunctivae and EOM are normal  Right eye exhibits no discharge  Left eye exhibits no discharge  Neck: No JVD present  Cardiovascular: Normal rate and regular rhythm  Exam reveals no gallop  Murmur heard  Pulmonary/Chest: Effort normal  No respiratory distress  She has no wheezes  She has no rales  She exhibits no tenderness  Abdominal: She exhibits no mass  There is no tenderness  There is no rebound  Musculoskeletal: She exhibits tenderness  She exhibits no edema  Positive tenderness in the lumbar spine leg raise test positive on the left side   Neurological: She is alert and oriented to person, place, and time  She displays abnormal reflex  No cranial nerve deficit  Coordination normal    Skin: No rash noted  No erythema           Assessment/Plan:    Pain in both lower extremities  Symptomatic pain and heaviness   Plan to start the gabapentin 100 mg at nighttime proper use of medication possible side effect discussed with the patient  Will do workup including will out Lyme disease will conduct do EMG study of lower extremity bilateral patient cannot have a MRI secondary to metal in her mouth the we can order CT scan of the lumbar spine and to rule out the discogenic disease versus spinal stenosis    Right ear pain  The physical exam of the ear is normal patient had a swelling in the sinus on the right side recommend to use the Flonase nasal spray 2 spray in her nostril once a day for 2 weeks the proper use and possible side effect discussed with the patient       Diagnoses and all orders for this visit:    Pain in both lower extremities  -     EMG 2 limb lower extremity; Future  -     Lyme Antibody Profile with reflex to WB; Future  -     CBC and differential; Future  -     Comprehensive metabolic panel; Future  -     Hemoglobin A1C; Future  -     Lipid panel; Future  -     TSH, 3rd generation; Future  -     Hepatitis C antibody; Future  -     CT spine lumbar wo contrast; Future    Right ear pain    Screening mammogram, encounter for  -     Mammo screening bilateral w cad; Future    Need for influenza vaccination  -     SYRINGE/SINGLE-DOSE VIAL: influenza vaccine, [id number], quadrivalent, 0 5 mL, preservative-free, for patients 3+ yr (FLUZONE)    Need for hepatitis C screening test  -     Hepatitis C antibody; Future    Chronic bilateral low back pain with bilateral sciatica  -     ibuprofen (MOTRIN) 600 mg tablet; Take 1 tablet (600 mg total) by mouth every 8 (eight) hours as needed for mild pain  -     gabapentin (NEURONTIN) 100 mg capsule; Take 1 capsule (100 mg total) by mouth daily at bedtime  -     Lyme Antibody Profile with reflex to WB; Future  -     CBC and differential; Future  -     Comprehensive metabolic panel; Future  -     Hemoglobin A1C; Future  -     Lipid panel; Future  -     TSH, 3rd generation; Future  -     Hepatitis C antibody; Future  -     CT spine lumbar wo contrast; Future    Hypothyroidism, unspecified type  -     Lipid panel; Future  -     TSH, 3rd generation; Future    IFG (impaired fasting glucose)  -     Comprehensive metabolic panel; Future    Other orders  -     Cancel: Hepatitis C antibody; Future  -     Cancel: Ambulatory referral to Gynecology;  Future  -     Cancel: MRI lumbar spine wo contrast; Future

## 2019-01-09 NOTE — ASSESSMENT & PLAN NOTE
Symptomatic pain and heaviness   Plan to start the gabapentin 100 mg at nighttime proper use of medication possible side effect discussed with the patient  Will do workup including will out Lyme disease will conduct do EMG study of lower extremity bilateral patient cannot have a MRI secondary to metal in her mouth the we can order CT scan of the lumbar spine and to rule out the discogenic disease versus spinal stenosis

## 2019-01-09 NOTE — ASSESSMENT & PLAN NOTE
The physical exam of the ear is normal patient had a swelling in the sinus on the right side recommend to use the Flonase nasal spray 2 spray in her nostril once a day for 2 weeks the proper use and possible side effect discussed with the patient

## 2019-01-28 ENCOUNTER — HOSPITAL ENCOUNTER (OUTPATIENT)
Dept: CT IMAGING | Facility: HOSPITAL | Age: 59
Discharge: HOME | End: 2019-01-28
Payer: COMMERCIAL

## 2019-01-28 DIAGNOSIS — M54.42 CHRONIC BILATERAL LOW BACK PAIN WITH BILATERAL SCIATICA: ICD-10-CM

## 2019-01-28 DIAGNOSIS — G89.29 CHRONIC BILATERAL LOW BACK PAIN WITH BILATERAL SCIATICA: ICD-10-CM

## 2019-01-28 DIAGNOSIS — M79.605 PAIN IN BOTH LOWER EXTREMITIES: ICD-10-CM

## 2019-01-28 DIAGNOSIS — M79.604 PAIN IN BOTH LOWER EXTREMITIES: ICD-10-CM

## 2019-01-28 DIAGNOSIS — M54.41 CHRONIC BILATERAL LOW BACK PAIN WITH BILATERAL SCIATICA: ICD-10-CM

## 2019-01-28 PROCEDURE — 72131 CT LUMBAR SPINE W/O DYE: CPT

## 2019-01-31 ENCOUNTER — TELEPHONE (OUTPATIENT)
Dept: FAMILY MEDICINE CLINIC | Facility: CLINIC | Age: 59
End: 2019-01-31

## 2019-01-31 DIAGNOSIS — M51.36 DEGENERATIVE DISC DISEASE, LUMBAR: Primary | ICD-10-CM

## 2019-01-31 NOTE — TELEPHONE ENCOUNTER
Pt aware, amb ref mailed to home with pt locations       ----- Message from Ángel Garrido MD sent at 1/31/2019  8:55 AM EST -----  Degenerative disc disease of lumber spine   Recommend physical therapy

## 2019-02-04 ENCOUNTER — OFFICE VISIT (OUTPATIENT)
Dept: GASTROENTEROLOGY | Facility: MEDICAL CENTER | Age: 59
End: 2019-02-04
Payer: COMMERCIAL

## 2019-02-04 VITALS
TEMPERATURE: 99 F | WEIGHT: 240 LBS | HEART RATE: 80 BPM | SYSTOLIC BLOOD PRESSURE: 122 MMHG | HEIGHT: 62 IN | BODY MASS INDEX: 44.16 KG/M2 | DIASTOLIC BLOOD PRESSURE: 80 MMHG

## 2019-02-04 DIAGNOSIS — K58.1 IRRITABLE BOWEL SYNDROME WITH CONSTIPATION: ICD-10-CM

## 2019-02-04 DIAGNOSIS — K76.0 FATTY LIVER DISEASE, NONALCOHOLIC: ICD-10-CM

## 2019-02-04 DIAGNOSIS — K21.9 GASTROESOPHAGEAL REFLUX DISEASE WITHOUT ESOPHAGITIS: Primary | ICD-10-CM

## 2019-02-04 PROCEDURE — 99214 OFFICE O/P EST MOD 30 MIN: CPT | Performed by: PHYSICIAN ASSISTANT

## 2019-02-04 NOTE — LETTER
February 4, 2019     Nany Singer MD  [address]     Patient: Luciano Gerber   YOB: 1960   Date of Visit: 2/4/2019       Dear Dr Brodie Moritz: Thank you for referring Luciano Gerber to me for evaluation  Below are my notes for this consultation  If you have questions, please do not hesitate to call me  I look forward to following your patient along with you           Sincerely,        Johnna Chamberlain PA-C        CC: No Recipients

## 2019-02-04 NOTE — PROGRESS NOTES
Assessment/Plan:     Diagnoses and all orders for this visit:    Gastroesophageal reflux disease without esophagitis  She has a history of GERD  This is likely related to her diet, excessive coffee drinking  I did recommend cutting back so that we do not need to increase medications  Also recommended weight loss as this will help  Fatty liver disease, nonalcoholic  She is found to have an elevated alkaline phosphatase  Workup was negative  She most likely has fatty liver disease  Also recommend slow sustained weight loss  Irritable bowel syndrome with constipation  She complains of abdominal bloating and has history of constipation  She was started on Linzess but states this caused diarrhea so she stopped  She states currently she is regular  I would recommend added fiber which should help keep her regular  We also discussed the low fodmap diet & starting a food journal     Will be here back in 3 months or sooner if necessary  Subjective:      Patient ID: Carlos Espitia is a 63 y o  female  HPI     This is a follow-up for elevated alk phos, GERD & IBS  She states she continues with GERD despite taking pantoprazole daily  She states she drinks a lot of coffee, 10 cups/ day  She also sometimes eats prior to going to bed  She describes abdominal bloating but no real pain  She was previously constipated & recommended to take Linzess but states it caused diarrhea  She states currently she is going almost every day  She was also seen for elevated alk phos  She was checked for iron panel, inr, hepatitis, alpha 1 antitrypsin, KEVIN, AMA, anti-smooth muscle antibody, ceruloplasmin, all of which was WNL's & it was felt it was secondary to fatty liver       Patient Active Problem List   Diagnosis    Allergic rhinitis    Calcaneal spur    Dandruff    Depressive disorder    Edema    Essential hypertension    Gastroesophageal reflux disease    Hypothyroidism    Impaired fasting glucose    Mixed hyperlipidemia    Lower extremity edema    Plantar fasciitis    Seborrheic dermatitis    Uterine leiomyoma    Vitamin D deficiency    Fatty liver disease, nonalcoholic    Alkaline phosphatase elevation    Chronic low back pain with bilateral sciatica    Morbid obesity (HCC)    Pain in both lower extremities    Right ear pain    Irritable bowel syndrome with constipation     Allergies   Allergen Reactions    No Active Allergies      Current Outpatient Prescriptions on File Prior to Visit   Medication Sig    atorvastatin (LIPITOR) 20 mg tablet TAKE 1 TABLET BY MOUTH EVERY DAY    ROSANA 5-40 MG Take 1 tablet by mouth daily    citalopram (CeleXA) 10 mg tablet TAKE 1 TABLET BY MOUTH EVERY EVENING    fexofenadine (ALLEGRA ALLERGY) 180 MG tablet take 1 tablet (180MG)  by oral route  every day    fluticasone (FLONASE) 50 mcg/act nasal spray as needed    furosemide (LASIX) 20 mg tablet Take 20 mg by mouth daily    gabapentin (NEURONTIN) 100 mg capsule Take 1 capsule (100 mg total) by mouth daily at bedtime    ibuprofen (MOTRIN) 600 mg tablet Take 1 tablet (600 mg total) by mouth every 8 (eight) hours as needed for mild pain    levothyroxine 112 mcg tablet     montelukast (SINGULAIR) 10 mg tablet TAKE 1 TABLET IN THE EVENING    pantoprazole (PROTONIX) 40 mg tablet Take 1 tablet (40 mg total) by mouth daily    [DISCONTINUED] Linaclotide 145 MCG CAPS Take 1 capsule (145 mcg total) by mouth daily (Patient not taking: Reported on 2/4/2019 )     No current facility-administered medications on file prior to visit        Family History   Problem Relation Age of Onset    Heart disease Father     Breast cancer Sister      Past Medical History:   Diagnosis Date    Allergic rhinitis     Cardiac murmur     Depression     GERD (gastroesophageal reflux disease)     Hyperlipidemia     Hypertension     Hypothyroidism     IFG (impaired fasting glucose)     Obesity     Vitamin D deficiency      Social History Social History    Marital status: /Civil Union     Spouse name: N/A    Number of children: N/A    Years of education: N/A     Social History Main Topics    Smoking status: Never Smoker    Smokeless tobacco: Never Used      Comment: no passive smoke exposure    Alcohol use No    Drug use: No    Sexual activity: Not Asked     Other Topics Concern    None     Social History Narrative    None     Past Surgical History:   Procedure Laterality Date     SECTION      3         Review of Systems   All other systems reviewed and are negative  Objective:      /80 (BP Location: Right arm, Patient Position: Sitting, Cuff Size: Adult)   Pulse 80   Temp 99 °F (37 2 °C) (Tympanic)   Ht 5' 2 2" (1 58 m)   Wt 109 kg (240 lb)   BMI 43 61 kg/m²          Physical Exam   Constitutional: She is oriented to person, place, and time  She appears well-developed and well-nourished  obese   HENT:   Head: Normocephalic and atraumatic  Eyes: Conjunctivae and EOM are normal    Neck: Normal range of motion  Cardiovascular: Normal rate and regular rhythm  Pulmonary/Chest: Effort normal and breath sounds normal    Abdominal: Soft  Bowel sounds are normal  She exhibits no distension  There is no tenderness  Musculoskeletal: Normal range of motion  Neurological: She is alert and oriented to person, place, and time  Skin: Skin is warm and dry  Psychiatric: She has a normal mood and affect   Her behavior is normal

## 2019-02-04 NOTE — PATIENT INSTRUCTIONS
Gastroesophageal Reflux Disease   AMBULATORY CARE:   Gastroesophageal reflux  reflux occurs when acid and food in the stomach back up into the esophagus  Gastroesophageal reflux disease (GERD) is reflux that occurs more than twice a week for a few weeks  It usually causes heartburn and other symptoms  GERD can cause other health problems over time if it is not treated  Common symptoms include:  Heartburn is the most common symptom of GERD  You may feel burning pain in your chest or below the breast bone  This usually occurs after meals and spreads to your neck, jaw, or shoulder  The pain gets better when you change positions  You may also have any of the following:  · Bitter or acid taste in your mouth    · Dry cough    · Trouble swallowing or pain with swallowing    · Hoarseness or sore throat    · Frequent burping or hiccups    · Feeling of fullness soon after you start eating  Seek care immediately if:  · You feel full and cannot burp or vomit  · You have severe chest pain and sudden trouble breathing  · Your bowel movements are black, bloody, or tarry-looking  · Your vomit looks like coffee grounds or has blood in it  Contact your healthcare provider if:   · You vomit large amounts, or you vomit often  · You have trouble breathing after you vomit  · You have trouble swallowing, or pain with swallowing  · You are losing weight without trying  · Your symptoms get worse or do not improve with treatment  · You have questions or concerns about your condition or care  Treatment for GERD:  Your healthcare provider may prescribe medicine to decrease stomach acid  He may also prescribe medicine that help your esophagus and stomach move food and liquid to your intestines  Surgery may be done if other treatments do not work  You may need surgery to wrap the upper part of the stomach around the esophageal sphincter  This will strengthen the sphincter and prevent reflux     Manage GERD: · Do not have foods or drinks that may increase heartburn  These include chocolate, peppermint, fried or fatty foods, drinks that contain caffeine, or carbonated drinks (soda)  Other foods include spicy foods, onions, tomatoes, and tomato-based foods  Do not have foods or drinks that can irritate your esophagus, such as citrus fruits, juices, and alcohol  · Do not eat large meals  When you eat a lot of food at one time, your stomach needs more acid to digest it  Eat 6 small meals each day instead of 3 large ones, and eat slowly  Do not eat meals 2 to 3 hours before bedtime  · Elevate the head of your bed  Place 6-inch blocks under the head of your bed frame  You may also use more than one pillow under your head and shoulders while you sleep  · Maintain a healthy weight  If you are overweight, weight loss may help relieve symptoms of GERD  · Do not smoke  Smoking weakens the lower esophageal sphincter and increases the risk of GERD  Ask your healthcare provider for information if you currently smoke and need help to quit  E-cigarettes or smokeless tobacco still contain nicotine  Talk to your healthcare provider before you use these products  · Do not wear clothing that is tight around your waist   Tight clothing can put pressure on your stomach and cause or worsen GERD symptoms  Follow up with your healthcare provider as directed:  Write down your questions so you remember to ask them during your visits  © 2017 [employer] Information is for End User's use only and may not be sold, redistributed or otherwise used for commercial purposes  All illustrations and images included in CareNotes® are the copyrighted property of [employer]  or [employer]  The above information is an  only  It is not intended as medical advice for individual conditions or treatments   Talk to your doctor, nurse or pharmacist before following any medical regimen to see if it is safe and effective for you  High Fiber Diet   WHAT YOU NEED TO KNOW:   A high-fiber diet includes foods that have a high amount of fiber  Fiber is the part of fruits, vegetables, and grains that is not broken down by your body  Fiber keeps your bowel movements regular  Fiber can also help lower your cholesterol level, control blood sugar in people with diabetes, and relieve constipation  Fiber can also help you control your weight because it helps you feel full faster  Most adults should eat 25 to 35 grams of fiber each day  Talk to your dietitian or healthcare provider about the amount of fiber you need  DISCHARGE INSTRUCTIONS:   Good sources of fiber:   · Foods with at least 4 grams of fiber per serving:      ¨ ? to ½ cup of high-fiber cereal (check the nutrition label on the box)    ¨ ½ cup of blackberries or raspberries    ¨ 4 dried prunes    ¨ 1 cooked artichoke    ¨ ½ cup of cooked legumes, such as lentils, or red, kidney, and bailon beans    · Foods with 1 to 3 grams of fiber per serving:      ¨ 1 slice of whole-wheat, pumpernickel, or rye bread    ¨ ½ cup of cooked brown rice    ¨ 4 whole-wheat crackers    ¨ 1 cup of oatmeal    ¨ ½ cup of cereal with 1 to 3 grams of fiber per serving (check the nutrition label on the box)    ¨ 1 small piece of fruit, such as an apple, banana, pear, kiwi, or orange    ¨ 3 dates    ¨ ½ cup of canned apricots, fruit cocktail, peaches, or pears    ¨ ½ cup of raw or cooked vegetables, such as carrots, cauliflower, cabbage, spinach, squash, or corn  Ways that you can increase fiber in your diet:   · Choose brown or wild rice instead of white rice  · Use whole wheat flour in recipes instead of white or all-purpose flour  · Add beans and peas to casseroles or soups  · Choose fresh fruit and vegetables with peels or skins on instead of juices  Other diet guidelines to follow:   · Add fiber to your diet slowly    You may have abdominal discomfort, bloating, and gas if you add fiber to your diet too quickly  · Drink plenty of liquids as you add fiber to your diet  You may have nausea or develop constipation if you do not drink enough water  Ask how much liquid to drink each day and which liquids are best for you  © 2017 [employer] Information is for End User's use only and may not be sold, redistributed or otherwise used for commercial purposes  All illustrations and images included in CareNotes® are the copyrighted property of [employer]  or [employer]  The above information is an  only  It is not intended as medical advice for individual conditions or treatments  Talk to your doctor, nurse or pharmacist before following any medical regimen to see if it is safe and effective for you

## 2019-02-06 LAB
HBA1C MFR BLD HPLC: 5.9 %
HCV AB SER-ACNC: NEGATIVE

## 2019-02-11 DIAGNOSIS — K21.9 GASTROESOPHAGEAL REFLUX DISEASE WITHOUT ESOPHAGITIS: ICD-10-CM

## 2019-02-11 RX ORDER — PANTOPRAZOLE SODIUM 40 MG/1
40 TABLET, DELAYED RELEASE ORAL DAILY
Qty: 90 TABLET | Refills: 0 | Status: SHIPPED | OUTPATIENT
Start: 2019-02-11 | End: 2019-05-21 | Stop reason: SDUPTHER

## 2019-02-18 ENCOUNTER — HOSPITAL ENCOUNTER (OUTPATIENT)
Dept: NEUROLOGY | Facility: CLINIC | Age: 59
Discharge: HOME | End: 2019-02-18
Payer: COMMERCIAL

## 2019-02-18 DIAGNOSIS — M79.604 PAIN IN BOTH LOWER EXTREMITIES: ICD-10-CM

## 2019-02-18 DIAGNOSIS — M79.605 PAIN IN BOTH LOWER EXTREMITIES: ICD-10-CM

## 2019-02-18 PROCEDURE — 95910 NRV CNDJ TEST 7-8 STUDIES: CPT | Performed by: PHYSICAL MEDICINE & REHABILITATION

## 2019-02-18 PROCEDURE — 95886 MUSC TEST DONE W/N TEST COMP: CPT | Performed by: PHYSICAL MEDICINE & REHABILITATION

## 2019-02-18 NOTE — PROCEDURES
[facility] , [address]   [phone]        Name: Corinna Hanks  Patient ID: [id number]   Age: 63 Years 5 Months  YOB: 1960   Account Number:    Gender: Female   Technologist:    Date of Exam: 2/18/2019 12:39   Referring Physician: Majo Walter MD  Temperature: 32 [phone]   Examining Physician: Digna Blackwell Md  Height:                 Patient History:   63 y o female with chronic low back pain starting in Aug 2018, that radiates to the bilateral LE to the toes  She denies weakness  She is referred for lumbar radiculopathy evaluation  5/5 bilateral hip flexion, knee extension, dorsi/plantarflexion, EHL  negative babinski bilateral         MNC      Nerve / Sites Muscle Latency Ref  Amplitude Ref  Duration Rel Amp Distance Lat Diff Velocity Ref  ms ms mV mV ms % mm ms m/s m/s   R Peroneal - EDB      Ankle EDB 3 96 ?5 50 3 0 ?2 0 5 21 100 80         Fib head EDB 9 27  2 9  5 78 95 1 250 5 31 47 ?40      Pop fossa EDB 11 04  1 8  5 68 64 6 100 1 77 56 ?40            7 08     L Peroneal - EDB      Ankle EDB 3 65 ?5 50 2 6 ?2 0 5 31 100 80         Fib head EDB 9 17  2 4  5 78 91 4 255 5 52 46 ?40      Pop fossa EDB 10 78  2 7  5 83 112 100 1 61 62 ?40            7 14     R Tibial - AH      Ankle AH 4 53 ?6 00 9 2 ?4 0 4 95 100 100         Pop fossa AH 10 73  7 5  5 89 81 1 300 6 20 48 ?40   L Tibial - AH      Ankle AH 4 53 ?6 00 11 9 ?4 0 5 36 100 100         Pop fossa AH 10 89  6 3  5 68 52 6 300 6 35 47 ?40       SNC      Nerve / Sites Rec  Site Onset Lat Peak Lat Ref  Amp Ref  Distance Velocity Ref  ms ms ms µV µV mm m/s m/s   R Sural - Ankle (Calf)      Calf Ankle 2 81 3 44 ?4 00 9 1 ?6 0 140 50 ?40   L Sural - Ankle (Calf)      Calf Ankle 2 45 3 28 ?4 00 9 6 ?6 0 140 57 ?40   R Superficial peroneal - Ankle      Lat leg Ankle 2 19 2 92 ?3 50 10 3 ? 6 0 100 46 ?40   L Superficial peroneal - Ankle      Lat leg Ankle 2 34 3 02 ?3 50 6 2 ?6 0 100 43 ?40       EMG         Needle EMG Examination     Insertional Spontaneous MUAP   Muscle Nerve Roots Activity Fib PSW Fasc Dur  Amp Poly Config Recruitment   R  Vastus medialis Femoral L2-L4 Normal None None None Normal Normal None Normal Normal   R  Tibialis anterior Peroneal L4-L5 Normal None None None Normal Normal None Normal Normal   R  Gastrocnemius (Medial head) Tibial S1-S2 Normal None None None Normal Normal None Normal Normal   R  Peroneus longus Perineal L5-S1 Normal None None None Normal Normal None Normal Normal   R  Gluteus medius Superior gluteal L4-S1 Normal None None None Normal Normal None Normal Normal   R  Biceps femoris (short head) Sciatic (peroneal division) L5-S2 Normal None None None Normal Normal None Normal Normal   R  Lumbar paraspinals Spinal L1-L5 Normal None None None        L  Vastus medialis Femoral L2-L4 Normal None None None Normal Normal None Normal Normal   L  Tibialis anterior Peroneal L4-L5 Normal None None None Normal Normal None Normal Normal   L  Gastrocnemius (Medial head) Tibial S1-S2 Normal None None None Normal Normal None Normal Normal   L  Peroneus longus Perineal L5-S1 Normal None None None Normal Normal None Normal Normal   L  Gluteus medius Superior gluteal L4-S1 Normal None None None Normal Normal None Normal Normal   L  Biceps femoris (short head) Sciatic (peroneal division) L5-S2 Normal None None None Normal Normal None Normal Normal   L  Lumbar paraspinals Spinal L1-L5 Normal None None None              Findings:   Motor:  Left peroneal compound motor action potential (CMAP) to the extensor digitorum brevis demonstrated normal distal latency, normal amplitude, and normal conduction velocity across the fibular head and lower leg  Left tibial compound motor action potential (CMAP) to the abductor hallucis demonstrated normal distal latency, normal amplitude, and normal conduction velocity across the lower leg       Right peroneal compound motor action potential (CMAP) to the extensor digitorum brevis demonstrated normal distal latency, normal amplitude, and normal conduction velocity across the fibular head and lower leg  Right tibial compound motor action potential (CMAP) to the abductor hallucis demonstrated normal distal latency, normal amplitude, and normal conduction velocity across the lower leg  Sensory:  Left sural sensory nerve action potential (SNAP) demonstrated normal peak latency and normal amplitude  Left superficial peroneal sensory nerve action potential (SNAP) demonstrated normal peak latency and normal amplitude  Right sural sensory nerve action potential (SNAP) demonstrated normal peak latency and normal amplitude  Right superficial peroneal sensory nerve action potential (SNAP) demonstrated normal peak latency and normal amplitude  EMG:  A disposable monopolar needle was used to study selected muscles in the left and right lower extremity including the tibialis anterior, medial gastrocnemius, peroneus longus, vastus medialis, biceps femoris, and gluteus medius  The lumbar paraspinals were tested  All muscles tested demonstrated normal insertional activity, no abnormal spontaneous activity, and normal volitional motor unit action potentials  Impression: Normal study  1  There is no electrodiagnostic evidence of a left or right tibial or peroneal mononeuropathy  2  There is no electrodiagnostic evidence of a left or right lumbosacral plexopathy or lumbar radiculopathy  3  There is no electrodiagnostic evidence of a large fiber peripheral polyneuropathy               ___________________________  [facility]

## 2019-02-21 DIAGNOSIS — R60.0 LOWER EXTREMITY EDEMA: ICD-10-CM

## 2019-02-21 DIAGNOSIS — J30.81 ALLERGIC RHINITIS DUE TO ANIMAL HAIR AND DANDER: Primary | ICD-10-CM

## 2019-02-21 RX ORDER — MONTELUKAST SODIUM 10 MG/1
10 TABLET ORAL EVERY EVENING
Qty: 90 TABLET | Refills: 1 | Status: SHIPPED | OUTPATIENT
Start: 2019-02-21 | End: 2019-08-26 | Stop reason: SDUPTHER

## 2019-02-21 RX ORDER — FUROSEMIDE 20 MG/1
20 TABLET ORAL DAILY
Qty: 90 TABLET | Refills: 1 | Status: SHIPPED | OUTPATIENT
Start: 2019-02-21 | End: 2019-08-26 | Stop reason: SDUPTHER

## 2019-03-15 ENCOUNTER — OFFICE VISIT (OUTPATIENT)
Dept: FAMILY MEDICINE CLINIC | Facility: CLINIC | Age: 59
End: 2019-03-15
Payer: COMMERCIAL

## 2019-03-15 ENCOUNTER — TELEPHONE (OUTPATIENT)
Dept: FAMILY MEDICINE CLINIC | Facility: CLINIC | Age: 59
End: 2019-03-15

## 2019-03-15 VITALS
OXYGEN SATURATION: 98 % | WEIGHT: 232 LBS | DIASTOLIC BLOOD PRESSURE: 70 MMHG | HEIGHT: 62 IN | SYSTOLIC BLOOD PRESSURE: 100 MMHG | HEART RATE: 95 BPM | BODY MASS INDEX: 42.69 KG/M2 | TEMPERATURE: 98 F

## 2019-03-15 DIAGNOSIS — R73.01 IMPAIRED FASTING GLUCOSE: ICD-10-CM

## 2019-03-15 DIAGNOSIS — E66.01 MORBID OBESITY (HCC): ICD-10-CM

## 2019-03-15 DIAGNOSIS — E78.2 MIXED HYPERLIPIDEMIA: ICD-10-CM

## 2019-03-15 DIAGNOSIS — J01.01 ACUTE RECURRENT MAXILLARY SINUSITIS: Primary | ICD-10-CM

## 2019-03-15 DIAGNOSIS — I10 ESSENTIAL HYPERTENSION: ICD-10-CM

## 2019-03-15 DIAGNOSIS — E03.9 ACQUIRED HYPOTHYROIDISM: ICD-10-CM

## 2019-03-15 DIAGNOSIS — K21.9 GASTROESOPHAGEAL REFLUX DISEASE WITHOUT ESOPHAGITIS: ICD-10-CM

## 2019-03-15 DIAGNOSIS — I51.7 MILD LEFT VENTRICULAR HYPERTROPHY: ICD-10-CM

## 2019-03-15 PROCEDURE — 3074F SYST BP LT 130 MM HG: CPT | Performed by: FAMILY MEDICINE

## 2019-03-15 PROCEDURE — 99214 OFFICE O/P EST MOD 30 MIN: CPT | Performed by: FAMILY MEDICINE

## 2019-03-15 PROCEDURE — 3078F DIAST BP <80 MM HG: CPT | Performed by: FAMILY MEDICINE

## 2019-03-15 PROCEDURE — 3008F BODY MASS INDEX DOCD: CPT | Performed by: FAMILY MEDICINE

## 2019-03-15 RX ORDER — METHYLPREDNISOLONE 4 MG/1
TABLET ORAL
Qty: 21 EACH | Refills: 0 | Status: SHIPPED | OUTPATIENT
Start: 2019-03-15 | End: 2019-08-26 | Stop reason: SDUPTHER

## 2019-03-15 NOTE — PATIENT INSTRUCTIONS

## 2019-03-15 NOTE — PROGRESS NOTES
Subjective:   Chief Complaint   Patient presents with    Earache    Headache    Follow-up     chronic condition        Patient ID: Marco Rodney is a 63 y o  female  Patient here follow-up with a chronic condition and she had a concerned about the headache congestion sinus pressure postnasal drip and the cough she described and productive no hematosis no wheezing no short of breath or no chest pain patient recently was in urgent care and the and she was diagnosed with a topical pneumonia and she was given Levaquin patient finished course of antibiotic and she still feel the sinus pressure and postnasal drip and headache chest x-ray was review was negative for pneumonia the patient using the Flonase nasal spray and is not helping  The patient's history of hypertension or fair control on current management tolerated medication well deny any chest pain short of breath no palpitation no dyspnea on exertion and lower extremity edema patient's history of hyperlipidemia on statin tolerated well her deny any chest pain short of breath no palpitation and no TIA symptom patient's history of GERD the will control on current medication tolerated well deny any abdomen pain nausea vomiting or diarrhea no heartburn no weight change patient history of hypothyroidism on levothyroxine 112 mcg tolerated well asymptomatic no heat or cold intolerance no dryness in the skin and no weight change patient history of impaired fasting glucose try to controlled with the diet deny any increased thirsty increased frequency urination no dizziness no abdomen pain  Recent blood work discussed with the patient      The following portions of the patient's history were reviewed and updated as appropriate: allergies, current medications, past family history, past medical history, past social history, past surgical history and problem list     Review of Systems   Constitutional: Negative for fatigue and fever     HENT: Positive for congestion, postnasal drip, sinus pressure and sinus pain  Negative for ear pain and sore throat  Eyes: Negative for pain and redness  Respiratory: Negative for cough, chest tightness and shortness of breath  Cardiovascular: Negative for chest pain, palpitations and leg swelling  Gastrointestinal: Negative for abdominal pain, blood in stool, constipation, diarrhea and nausea  Genitourinary: Negative for flank pain, frequency and hematuria  Musculoskeletal: Negative for back pain and joint swelling  Skin: Negative for rash  Neurological: Negative for dizziness, numbness and headaches  Hematological: Does not bruise/bleed easily  Objective:  Vitals:    03/15/19 1137   BP: 100/70   Pulse: 95   Temp: 98 °F (36 7 °C)   TempSrc: Oral   SpO2: 98%   Weight: 105 kg (232 lb)   Height: 5' 2" (1 575 m)      Physical Exam   Constitutional: She is oriented to person, place, and time  She appears well-developed and well-nourished  HENT:   Head: Normocephalic  Right Ear: External ear normal    Left Ear: External ear normal    Eyes: Conjunctivae and EOM are normal  Right eye exhibits no discharge  Left eye exhibits no discharge  Neck: No JVD present  Cardiovascular: Normal rate and regular rhythm  Exam reveals no gallop  Pulmonary/Chest: Effort normal  No respiratory distress  She has no wheezes  She has no rales  She exhibits no tenderness  Abdominal: She exhibits no mass  There is no tenderness  There is no rebound  Musculoskeletal: She exhibits no edema or tenderness  Neurological: She is alert and oriented to person, place, and time  Skin: No rash noted  No erythema           Assessment/Plan:    Gastroesophageal reflux disease  A chronic asymptomatic fair control pantoprazole 40 mg once a day the discussed the patient important lose weight and do not eat and lie down eat multiple small meals and avoid provoked food    Impaired fasting glucose  Chronic asymptomatic fair control encouraged patient to continue low carb diet and important lose weight    Essential hypertension  The the way it is expected chronic asymptomatic fair control continue current management  The low-salt diet increase physical activity and important lose weight discussed with the patient    Morbid obesity ([address] )  BMI above 40 I discussed with the patient bariatric action and patient declined the discussed with the patient refer to nutritionist and also she declined the patient would like to do it by herself we discussed the patient counting calorie increase physical activity 150 minutes a week and portion control    Mixed hyperlipidemia  Chronic asymptomatic fair control continue atorvastatin 20 mg once a day proper use of medication possible side effect discussed with the patient we discussed the patient increase the physical activity and low-fat diet    Hypothyroidism  Chronic asymptomatic fair control continue with the levothyroxine 112 mcg once a day proper use of medication possible side effect discussed with the patient    Acute recurrent maxillary sinusitis  Acute symptomatic patient recently was in urgent care she was on the antibiotic Levaquin and patient a still symptomatic Medrol pack as directed on the pack proper use of medication possible side effect discussed with the patient       Diagnoses and all orders for this visit:    Acute recurrent maxillary sinusitis  -     methylPREDNISolone 4 MG tablet therapy pack; Use as directed on package  -     CBC and differential; Future  -     Comprehensive metabolic panel; Future  -     Hemoglobin A1C; Future  -     Lipid panel; Future  -     Microalbumin / creatinine urine ratio  -     TSH, 3rd generation with Free T4 reflex; Future    Gastroesophageal reflux disease without esophagitis  -     CBC and differential; Future  -     Comprehensive metabolic panel; Future  -     Hemoglobin A1C; Future  -     Lipid panel;  Future  -     Microalbumin / creatinine urine ratio  -     TSH, 3rd generation with Free T4 reflex; Future    Impaired fasting glucose  -     CBC and differential; Future  -     Comprehensive metabolic panel; Future  -     Hemoglobin A1C; Future  -     Lipid panel; Future  -     Microalbumin / creatinine urine ratio  -     TSH, 3rd generation with Free T4 reflex; Future    Essential hypertension  -     CBC and differential; Future  -     Comprehensive metabolic panel; Future  -     Hemoglobin A1C; Future  -     Lipid panel; Future  -     Microalbumin / creatinine urine ratio  -     TSH, 3rd generation with Free T4 reflex; Future    Mild left ventricular hypertrophy  -     CBC and differential; Future  -     Comprehensive metabolic panel; Future  -     Hemoglobin A1C; Future  -     Lipid panel; Future  -     Microalbumin / creatinine urine ratio  -     TSH, 3rd generation with Free T4 reflex; Future  -     Echo complete with contrast if indicated; Future    Morbid obesity (HCC)  -     CBC and differential; Future  -     Comprehensive metabolic panel; Future  -     Hemoglobin A1C; Future  -     Lipid panel; Future  -     Microalbumin / creatinine urine ratio  -     TSH, 3rd generation with Free T4 reflex; Future    Mixed hyperlipidemia    Acquired hypothyroidism          BMI Counseling: Body mass index is 42 43 kg/m²  Discussed the patient's BMI with her  The BMI is above average  BMI counseling and education was provided to the patient  Nutrition recommendations include reducing portion sizes, decreasing overall calorie intake, 3-5 servings of fruits/vegetables daily, reducing fast food intake, consuming healthier snacks, decreasing soda and/or juice intake, moderation in carbohydrate intake and reducing intake of cholesterol  Exercise recommendations include moderate aerobic physical activity for 150 minutes/week

## 2019-03-16 PROBLEM — J01.01 ACUTE RECURRENT MAXILLARY SINUSITIS: Status: ACTIVE | Noted: 2019-03-16

## 2019-03-16 NOTE — ASSESSMENT & PLAN NOTE
BMI above 40 I discussed with the patient bariatric action and patient declined the discussed with the patient refer to nutritionist and also she declined the patient would like to do it by herself we discussed the patient counting calorie increase physical activity 150 minutes a week and portion control

## 2019-03-16 NOTE — ASSESSMENT & PLAN NOTE
Chronic asymptomatic fair control continue atorvastatin 20 mg once a day proper use of medication possible side effect discussed with the patient we discussed the patient increase the physical activity and low-fat diet

## 2019-03-16 NOTE — ASSESSMENT & PLAN NOTE
Acute symptomatic patient recently was in urgent care she was on the antibiotic Levaquin and patient a still symptomatic Medrol pack as directed on the pack proper use of medication possible side effect discussed with the patient

## 2019-03-16 NOTE — ASSESSMENT & PLAN NOTE
A chronic asymptomatic fair control pantoprazole 40 mg once a day the discussed the patient important lose weight and do not eat and lie down eat multiple small meals and avoid provoked food

## 2019-03-16 NOTE — ASSESSMENT & PLAN NOTE
Chronic asymptomatic fair control encouraged patient to continue low carb diet and important lose weight

## 2019-03-16 NOTE — ASSESSMENT & PLAN NOTE
Chronic asymptomatic fair control continue with the levothyroxine 112 mcg once a day proper use of medication possible side effect discussed with the patient

## 2019-03-16 NOTE — ASSESSMENT & PLAN NOTE
The the way it is expected chronic asymptomatic fair control continue current management  The low-salt diet increase physical activity and important lose weight discussed with the patient

## 2019-03-19 DIAGNOSIS — I10 ESSENTIAL HYPERTENSION: Primary | ICD-10-CM

## 2019-03-19 RX ORDER — AMLODIPINE BESYLATE AND OLMESARTAN MEDOXOMIL 5; 40 MG/1; MG/1
1 TABLET, FILM COATED ORAL DAILY
Qty: 90 TABLET | Refills: 1 | Status: SHIPPED | OUTPATIENT
Start: 2019-03-19 | End: 2019-08-05 | Stop reason: SDUPTHER

## 2019-03-28 DIAGNOSIS — E78.2 MIXED HYPERLIPIDEMIA: Primary | ICD-10-CM

## 2019-03-28 RX ORDER — ATORVASTATIN CALCIUM 20 MG/1
20 TABLET, FILM COATED ORAL DAILY
Qty: 90 TABLET | Refills: 0 | Status: SHIPPED | OUTPATIENT
Start: 2019-03-28 | End: 2019-06-17 | Stop reason: SDUPTHER

## 2019-04-29 ENCOUNTER — OFFICE VISIT (OUTPATIENT)
Dept: GASTROENTEROLOGY | Facility: MEDICAL CENTER | Age: 59
End: 2019-04-29
Payer: COMMERCIAL

## 2019-04-29 VITALS
DIASTOLIC BLOOD PRESSURE: 70 MMHG | HEART RATE: 80 BPM | BODY MASS INDEX: 43.98 KG/M2 | HEIGHT: 62 IN | TEMPERATURE: 97.5 F | WEIGHT: 239 LBS | SYSTOLIC BLOOD PRESSURE: 122 MMHG

## 2019-04-29 DIAGNOSIS — K21.9 GASTROESOPHAGEAL REFLUX DISEASE WITHOUT ESOPHAGITIS: ICD-10-CM

## 2019-04-29 DIAGNOSIS — K58.1 IRRITABLE BOWEL SYNDROME WITH CONSTIPATION: ICD-10-CM

## 2019-04-29 DIAGNOSIS — K76.0 FATTY LIVER DISEASE, NONALCOHOLIC: Primary | ICD-10-CM

## 2019-04-29 PROCEDURE — 99214 OFFICE O/P EST MOD 30 MIN: CPT | Performed by: PHYSICIAN ASSISTANT

## 2019-05-02 ENCOUNTER — HOSPITAL ENCOUNTER (OUTPATIENT)
Dept: NON INVASIVE DIAGNOSTICS | Facility: HOSPITAL | Age: 59
Discharge: HOME | End: 2019-05-02
Payer: COMMERCIAL

## 2019-05-02 DIAGNOSIS — I51.7 MILD LEFT VENTRICULAR HYPERTROPHY: ICD-10-CM

## 2019-05-02 PROCEDURE — 93306 TTE W/DOPPLER COMPLETE: CPT

## 2019-05-02 PROCEDURE — 93306 TTE W/DOPPLER COMPLETE: CPT | Performed by: INTERNAL MEDICINE

## 2019-05-20 ENCOUNTER — TELEPHONE (OUTPATIENT)
Dept: FAMILY MEDICINE CLINIC | Facility: CLINIC | Age: 59
End: 2019-05-20

## 2019-05-20 DIAGNOSIS — E03.9 ACQUIRED HYPOTHYROIDISM: Primary | ICD-10-CM

## 2019-05-20 RX ORDER — LEVOTHYROXINE SODIUM 112 UG/1
112 TABLET ORAL DAILY
Qty: 90 TABLET | Refills: 1 | Status: SHIPPED | OUTPATIENT
Start: 2019-05-20 | End: 2019-08-26 | Stop reason: SDUPTHER

## 2019-05-21 DIAGNOSIS — K21.9 GASTROESOPHAGEAL REFLUX DISEASE WITHOUT ESOPHAGITIS: ICD-10-CM

## 2019-05-21 RX ORDER — PANTOPRAZOLE SODIUM 40 MG/1
40 TABLET, DELAYED RELEASE ORAL DAILY
Qty: 90 TABLET | Refills: 0 | Status: SHIPPED | OUTPATIENT
Start: 2019-05-21 | End: 2019-08-26 | Stop reason: SDUPTHER

## 2019-06-17 DIAGNOSIS — E78.2 MIXED HYPERLIPIDEMIA: ICD-10-CM

## 2019-06-17 DIAGNOSIS — F32.A DEPRESSIVE DISORDER: Primary | ICD-10-CM

## 2019-06-17 RX ORDER — ATORVASTATIN CALCIUM 20 MG/1
20 TABLET, FILM COATED ORAL DAILY
Qty: 90 TABLET | Refills: 1 | Status: SHIPPED | OUTPATIENT
Start: 2019-06-17 | End: 2019-08-26 | Stop reason: SDUPTHER

## 2019-06-17 RX ORDER — CITALOPRAM 10 MG/1
10 TABLET ORAL EVERY EVENING
Qty: 90 TABLET | Refills: 1 | Status: SHIPPED | OUTPATIENT
Start: 2019-06-17 | End: 2019-08-26 | Stop reason: SDUPTHER

## 2019-08-05 DIAGNOSIS — I10 ESSENTIAL HYPERTENSION: ICD-10-CM

## 2019-08-05 RX ORDER — AMLODIPINE BESYLATE AND OLMESARTAN MEDOXOMIL 5; 40 MG/1; MG/1
1 TABLET, FILM COATED ORAL DAILY
Qty: 30 TABLET | Refills: 0 | Status: SHIPPED | OUTPATIENT
Start: 2019-08-05 | End: 2019-08-07 | Stop reason: SDUPTHER

## 2019-08-07 DIAGNOSIS — I10 ESSENTIAL HYPERTENSION: ICD-10-CM

## 2019-08-07 RX ORDER — AMLODIPINE BESYLATE AND OLMESARTAN MEDOXOMIL 5; 40 MG/1; MG/1
1 TABLET, FILM COATED ORAL DAILY
Qty: 30 TABLET | Refills: 0 | Status: SHIPPED | OUTPATIENT
Start: 2019-08-07 | End: 2019-08-26 | Stop reason: SDUPTHER

## 2019-08-26 ENCOUNTER — OFFICE VISIT (OUTPATIENT)
Dept: FAMILY MEDICINE CLINIC | Facility: CLINIC | Age: 59
End: 2019-08-26
Payer: COMMERCIAL

## 2019-08-26 VITALS
HEART RATE: 86 BPM | HEIGHT: 62 IN | SYSTOLIC BLOOD PRESSURE: 120 MMHG | OXYGEN SATURATION: 97 % | BODY MASS INDEX: 42.33 KG/M2 | DIASTOLIC BLOOD PRESSURE: 70 MMHG | WEIGHT: 230 LBS | TEMPERATURE: 98.4 F

## 2019-08-26 DIAGNOSIS — F32.A DEPRESSIVE DISORDER: ICD-10-CM

## 2019-08-26 DIAGNOSIS — Z12.31 ENCOUNTER FOR SCREENING MAMMOGRAM FOR BREAST CANCER: ICD-10-CM

## 2019-08-26 DIAGNOSIS — R60.0 LOWER EXTREMITY EDEMA: ICD-10-CM

## 2019-08-26 DIAGNOSIS — E78.2 MIXED HYPERLIPIDEMIA: ICD-10-CM

## 2019-08-26 DIAGNOSIS — J30.81 ALLERGIC RHINITIS DUE TO ANIMAL HAIR AND DANDER: ICD-10-CM

## 2019-08-26 DIAGNOSIS — Z00.01 ENCOUNTER FOR WELL ADULT EXAM WITH ABNORMAL FINDINGS: Primary | ICD-10-CM

## 2019-08-26 DIAGNOSIS — M47.27 OSTEOARTHRITIS OF SPINE WITH RADICULOPATHY, LUMBOSACRAL REGION: ICD-10-CM

## 2019-08-26 DIAGNOSIS — I10 ESSENTIAL HYPERTENSION: ICD-10-CM

## 2019-08-26 DIAGNOSIS — K21.9 GASTROESOPHAGEAL REFLUX DISEASE WITHOUT ESOPHAGITIS: ICD-10-CM

## 2019-08-26 DIAGNOSIS — Z23 NEED FOR SHINGLES VACCINE: ICD-10-CM

## 2019-08-26 DIAGNOSIS — R73.01 IMPAIRED FASTING GLUCOSE: ICD-10-CM

## 2019-08-26 DIAGNOSIS — E55.9 VITAMIN D DEFICIENCY: ICD-10-CM

## 2019-08-26 DIAGNOSIS — E03.9 ACQUIRED HYPOTHYROIDISM: ICD-10-CM

## 2019-08-26 PROCEDURE — 99214 OFFICE O/P EST MOD 30 MIN: CPT | Performed by: FAMILY MEDICINE

## 2019-08-26 PROCEDURE — 3074F SYST BP LT 130 MM HG: CPT | Performed by: FAMILY MEDICINE

## 2019-08-26 PROCEDURE — 99396 PREV VISIT EST AGE 40-64: CPT | Performed by: FAMILY MEDICINE

## 2019-08-26 PROCEDURE — 3008F BODY MASS INDEX DOCD: CPT | Performed by: FAMILY MEDICINE

## 2019-08-26 RX ORDER — MONTELUKAST SODIUM 10 MG/1
10 TABLET ORAL EVERY EVENING
Qty: 90 TABLET | Refills: 1 | Status: SHIPPED | OUTPATIENT
Start: 2019-08-26 | End: 2020-01-15 | Stop reason: SDUPTHER

## 2019-08-26 RX ORDER — ATORVASTATIN CALCIUM 20 MG/1
20 TABLET, FILM COATED ORAL DAILY
Qty: 90 TABLET | Refills: 1 | Status: SHIPPED | OUTPATIENT
Start: 2019-08-26 | End: 2020-04-22 | Stop reason: SDUPTHER

## 2019-08-26 RX ORDER — AMLODIPINE BESYLATE AND OLMESARTAN MEDOXOMIL 5; 40 MG/1; MG/1
1 TABLET, FILM COATED ORAL DAILY
Qty: 90 TABLET | Refills: 1 | Status: SHIPPED | OUTPATIENT
Start: 2019-08-26 | End: 2020-01-15 | Stop reason: SDUPTHER

## 2019-08-26 RX ORDER — MELOXICAM 7.5 MG/1
7.5 TABLET ORAL DAILY
Qty: 30 TABLET | Refills: 1 | Status: SHIPPED | OUTPATIENT
Start: 2019-08-26 | End: 2019-09-10 | Stop reason: ALTCHOICE

## 2019-08-26 RX ORDER — FUROSEMIDE 20 MG/1
20 TABLET ORAL DAILY
Qty: 90 TABLET | Refills: 1 | Status: SHIPPED | OUTPATIENT
Start: 2019-08-26 | End: 2020-01-15 | Stop reason: SDUPTHER

## 2019-08-26 RX ORDER — CITALOPRAM 10 MG/1
10 TABLET ORAL EVERY EVENING
Qty: 90 TABLET | Refills: 1 | Status: SHIPPED | OUTPATIENT
Start: 2019-08-26 | End: 2019-10-17 | Stop reason: DRUGHIGH

## 2019-08-26 RX ORDER — PANTOPRAZOLE SODIUM 40 MG/1
40 TABLET, DELAYED RELEASE ORAL DAILY
Qty: 90 TABLET | Refills: 1 | Status: SHIPPED | OUTPATIENT
Start: 2019-08-26 | End: 2019-09-10 | Stop reason: ALTCHOICE

## 2019-08-26 RX ORDER — LEVOTHYROXINE SODIUM 112 UG/1
112 TABLET ORAL DAILY
Qty: 90 TABLET | Refills: 1 | Status: SHIPPED | OUTPATIENT
Start: 2019-08-26 | End: 2020-04-22 | Stop reason: SDUPTHER

## 2019-08-26 NOTE — PROGRESS NOTES
Subjective:   Chief Complaint   Patient presents with    Physical Exam        Patient ID: Paty Almaraz is a 63 y o  female  Patient here for annual physical exam and she is concerned about her back pain patient known to have history of chronic back pain with osteoarthritis on her lumbar spine previous workup has been done and patient notice her back had flare up start the couple weeks ago and she did have a travel to [place] to visit her daughter and when she came back she had that pain she described it as a achy 7/10 localized in the lumbar spine and shoot bilateral lower extremity posterior thigh does not pass the knee and worse when she stand for long time ago up the steps no recent fall no recent trauma no other joint pain or swelling no lose control of the urine or stool no fever and no change in the weight  Patient's history of hypertension fair control on current medication and tolerated medication well deny any chest pain short of breath no palpitation no dyspnea on exertion and patient was history of depression on citalopram on 10 mg patient did had trial of start the medication but the she restarted again patient tolerated well without side effect deny any suicide attempt or ideation patient history of the the allergic rhinitis chronic her symptom stable she is on antihistamine nasal spray and Singulair tolerated medication well without any side effect deny any congestion no cough and no postnasal drip      The following portions of the patient's history were reviewed and updated as appropriate: allergies, current medications, past family history, past medical history, past social history, past surgical history and problem list     Review of Systems   Constitutional: Negative for fatigue and fever  HENT: Negative for ear pain, sinus pressure, sinus pain and sore throat  Eyes: Negative for pain and redness  Respiratory: Negative for cough, chest tightness and shortness of breath  Cardiovascular: Negative for chest pain, palpitations and leg swelling  Gastrointestinal: Negative for abdominal pain, blood in stool, constipation, diarrhea and nausea  Genitourinary: Negative for flank pain, frequency and hematuria  Musculoskeletal: Positive for back pain  Negative for joint swelling  Skin: Negative for rash  Neurological: Negative for dizziness, numbness and headaches  Hematological: Does not bruise/bleed easily  Objective:  Vitals:    08/26/19 0942   BP: 120/70   Pulse: 86   Temp: 98 4 °F (36 9 °C)   TempSrc: Tympanic   SpO2: 97%   Weight: 104 kg (230 lb)   Height: 5' 2 25" (1 581 m)      Physical Exam   Constitutional: She is oriented to person, place, and time  She appears well-developed and well-nourished  HENT:   Head: Normocephalic  Right Ear: External ear normal    Left Ear: External ear normal    Eyes: Conjunctivae and EOM are normal  Right eye exhibits no discharge  Left eye exhibits no discharge  Neck: No JVD present  Cardiovascular: Normal rate, regular rhythm and normal heart sounds  Exam reveals no gallop  No murmur heard  Pulmonary/Chest: Effort normal  No respiratory distress  She has no wheezes  She has no rales  She exhibits no tenderness  Abdominal: She exhibits no mass  There is no tenderness  There is no rebound  Musculoskeletal: She exhibits tenderness  She exhibits no edema  Positive tenderness in the lumbar spine L4 and 5    Neurological: She is alert and oriented to person, place, and time  Skin: No rash noted  No erythema  Assessment/Plan:    Encounter for well adult exam with abnormal findings  Advice and education were given regarding nutrition, aerobic exercises, weight bearing exercises, cardiovascular risk reduction, fall risk reduction, and age appropriate supplements         The patient was counseled regarding instructions for management, risk factor reductions, prognosis, risks and benefits of treatment options, patient and family education, and importance of compliance with treatment  Patient is due for mammogram ordered today the patient also due for shingle vaccine she decline it    Osteoarthritis of spine with radiculopathy, lumbosacral region  Acute on chronic will start the patient on Mobic 7 5 mg once a day proper use of medication possible side effect discussed with the patient discussed the patient important lose weight and proper postural and proper exercise    Essential hypertension  A chronic asymptomatic fair control continue current management discussed the patient low-salt diet important lose weight and she is due for blood work to check her BMP    Depressive disorder  A chronic asymptomatic fair control on citalopram tolerated well continue current management    Allergic rhinitis  A chronic asymptomatic continue with the Flonase nasal spray and continue with the Singulair 10 mg once a day and patient use the Allegra 180 mg 1 daily proper use of medication discussed with the patient       Diagnoses and all orders for this visit:    Encounter for well adult exam with abnormal findings    Osteoarthritis of spine with radiculopathy, lumbosacral region  -     meloxicam (MOBIC) 7 5 mg tablet; Take 1 tablet (7 5 mg total) by mouth daily    Essential hypertension  -     ROSANA 5-40 MG; Take 1 tablet by mouth daily  -     CBC and differential; Future  -     Basic metabolic panel; Future  -     TSH, 3rd generation with Free T4 reflex; Future  -     Lipid Panel with Direct LDL reflex; Future  -     Hemoglobin A1C; Future    Allergic rhinitis due to animal hair and dander  -     montelukast (SINGULAIR) 10 mg tablet; Take 1 tablet (10 mg total) by mouth every evening    Encounter for screening mammogram for breast cancer  -     Mammo screening bilateral w cad; Future    Acquired hypothyroidism  -     levothyroxine 112 mcg tablet;  Take 1 tablet (112 mcg total) by mouth daily  -     CBC and differential; Future  - Basic metabolic panel; Future  -     TSH, 3rd generation with Free T4 reflex; Future  -     Lipid Panel with Direct LDL reflex; Future  -     Hemoglobin A1C; Future    Lower extremity edema  -     furosemide (LASIX) 20 mg tablet; Take 1 tablet (20 mg total) by mouth daily  -     CBC and differential; Future  -     Basic metabolic panel; Future  -     TSH, 3rd generation with Free T4 reflex; Future  -     Lipid Panel with Direct LDL reflex; Future  -     Hemoglobin A1C; Future    Mixed hyperlipidemia  -     atorvastatin (LIPITOR) 20 mg tablet; Take 1 tablet (20 mg total) by mouth daily  -     CBC and differential; Future  -     Basic metabolic panel; Future  -     TSH, 3rd generation with Free T4 reflex; Future  -     Lipid Panel with Direct LDL reflex; Future  -     Hemoglobin A1C; Future    Gastroesophageal reflux disease without esophagitis  -     pantoprazole (PROTONIX) 40 mg tablet; Take 1 tablet (40 mg total) by mouth daily  -     CBC and differential; Future  -     Basic metabolic panel; Future  -     TSH, 3rd generation with Free T4 reflex; Future  -     Lipid Panel with Direct LDL reflex; Future  -     Hemoglobin A1C; Future    Depressive disorder  -     citalopram (CeleXA) 10 mg tablet; Take 1 tablet (10 mg total) by mouth every evening    Need for shingles vaccine    Impaired fasting glucose  -     CBC and differential; Future  -     Basic metabolic panel; Future  -     TSH, 3rd generation with Free T4 reflex; Future  -     Lipid Panel with Direct LDL reflex; Future  -     Hemoglobin A1C; Future    Vitamin D deficiency  -     CBC and differential; Future  -     Basic metabolic panel; Future  -     TSH, 3rd generation with Free T4 reflex; Future  -     Lipid Panel with Direct LDL reflex; Future  -     Hemoglobin A1C; Future  -     Vitamin D 25 hydroxy;  Future

## 2019-08-26 NOTE — PROGRESS NOTES
[facility] HEALTH MAINTENANCE OFFICE VISIT  [facility] [place] [facility]    NAME: Alma Sarkar  AGE: 63 y o   SEX: female  : 1960     DATE: 2019    Assessment and Plan     Problem List Items Addressed This Visit        Digestive    Gastroesophageal reflux disease    Relevant Medications    pantoprazole (PROTONIX) 40 mg tablet    Other Relevant Orders    CBC and differential    Basic metabolic panel    TSH, 3rd generation with Free T4 reflex    Lipid Panel with Direct LDL reflex    Hemoglobin A1C       Endocrine    Hypothyroidism    Relevant Medications    levothyroxine 112 mcg tablet    Other Relevant Orders    CBC and differential    Basic metabolic panel    TSH, 3rd generation with Free T4 reflex    Lipid Panel with Direct LDL reflex    Hemoglobin A1C    Impaired fasting glucose    Relevant Orders    CBC and differential    Basic metabolic panel    TSH, 3rd generation with Free T4 reflex    Lipid Panel with Direct LDL reflex    Hemoglobin A1C       Respiratory    Allergic rhinitis     A chronic asymptomatic continue with the Flonase nasal spray and continue with the Singulair 10 mg once a day and patient use the Allegra 180 mg 1 daily proper use of medication discussed with the patient         Relevant Medications    montelukast (SINGULAIR) 10 mg tablet       Cardiovascular and Mediastinum    Essential hypertension     A chronic asymptomatic fair control continue current management discussed the patient low-salt diet important lose weight and she is due for blood work to check her BMP         Relevant Medications    ROSANA 5-40 MG    furosemide (LASIX) 20 mg tablet    Other Relevant Orders    CBC and differential    Basic metabolic panel    TSH, 3rd generation with Free T4 reflex    Lipid Panel with Direct LDL reflex    Hemoglobin A1C       Nervous and Auditory    Osteoarthritis of spine with radiculopathy, lumbosacral region     Acute on chronic will start the patient on Mobic 7 5 mg once a day proper use of medication possible side effect discussed with the patient discussed the patient important lose weight and proper postural and proper exercise         Relevant Medications    meloxicam (MOBIC) 7 5 mg tablet       Other    Depressive disorder     A chronic asymptomatic fair control on citalopram tolerated well continue current management         Relevant Medications    citalopram (CeleXA) 10 mg tablet    Mixed hyperlipidemia    Relevant Medications    atorvastatin (LIPITOR) 20 mg tablet    Other Relevant Orders    CBC and differential    Basic metabolic panel    TSH, 3rd generation with Free T4 reflex    Lipid Panel with Direct LDL reflex    Hemoglobin A1C    Lower extremity edema    Relevant Medications    furosemide (LASIX) 20 mg tablet    Other Relevant Orders    CBC and differential    Basic metabolic panel    TSH, 3rd generation with Free T4 reflex    Lipid Panel with Direct LDL reflex    Hemoglobin A1C    Vitamin D deficiency    Relevant Orders    CBC and differential    Basic metabolic panel    TSH, 3rd generation with Free T4 reflex    Lipid Panel with Direct LDL reflex    Hemoglobin A1C    Vitamin D 25 hydroxy    Encounter for well adult exam with abnormal findings - Primary     Advice and education were given regarding nutrition, aerobic exercises, weight bearing exercises, cardiovascular risk reduction, fall risk reduction, and age appropriate supplements  The patient was counseled regarding instructions for management, risk factor reductions, prognosis, risks and benefits of treatment options, patient and family education, and importance of compliance with treatment       Patient is due for mammogram ordered today the patient also due for shingle vaccine she decline it           Other Visit Diagnoses     Encounter for screening mammogram for breast cancer        Relevant Orders    Mammo screening bilateral w cad    Need for shingles vaccine · Patient Counseling:   · Nutrition: Stressed importance of a well balanced diet, moderation of sodium/saturated fat, caloric balance and sufficient intake of fiber  · Exercise: Stressed the importance of regular exercise with a goal of 150 minutes per week  · Dental Health: Discussed daily flossing and brushing and regular dental visits     · Immunizations reviewed the patient is due for shingle vaccine she decline it  · Discussed benefits of screening patient is due for mammogram will order today patient also due for her Pap smear script for cervical cancer and she is up-to-date with her colonoscopy   BMI Counseling: Body mass index is 41 73 kg/m²  Discussed with patient's BMI with her            Chief Complaint     Chief Complaint   Patient presents with    Physical Exam       History of Present Illness     Patient here for annual physical exam she deny any chest pain short of breath no palpitation no headache no blurred vision no weakness or lateralized of the symptom no abdomen pain nausea vomiting or diarrhea no renal problem no rash no fever no change in the weight and no change in the mood and she does watch for the low carb diet she rarely do any exercise       Well Adult Physical   Patient here for a comprehensive physical exam       Diet and Physical Activity  Diet: low carbohydrate diet and  Exercise: rarely      Depression Screen  PHQ-9 Depression Screening    PHQ-9:    Frequency of the following problems over the past two weeks:       Little interest or pleasure in doing things:  0 - not at all  Feeling down, depressed, or hopeless:  0 - not at all  Trouble falling or staying asleep, or sleeping too much:  0 - not at all  Feeling tired or having little energy:  0 - not at all  Poor appetite or overeatin - not at all  Feeling bad about yourself - or that you are a failure or have let yourself or your family down:  0 - not at all  Trouble concentrating on things, such as reading the newspaper or watching television:  0 - not at all  Moving or speaking so slowly that other people could have noticed  Or the opposite - being so fidgety or restless that you have been moving around a lot more than usual:  0 - not at all  Thoughts that you would be better off dead, or of hurting yourself in some way:  0 - not at all  PHQ-2 Score:  0          General Health  Hearing: Normal:  bilateral  Vision: most recent eye exam >1 year  Dental: regular dental visits    Reproductive Health  Last PAP 01/2016  Last menstural was 49 y old      The following portions of the patient's history were reviewed and updated as appropriate: allergies, current medications, past family history, past medical history, past social history, past surgical history and problem list     Review of Systems     Review of Systems   Constitutional: Negative for fatigue and fever  HENT: Negative for ear pain, sinus pressure, sinus pain and sore throat  Eyes: Negative for pain and redness  Respiratory: Negative for cough, chest tightness and shortness of breath  Cardiovascular: Negative for chest pain, palpitations and leg swelling  Gastrointestinal: Negative for abdominal pain, blood in stool, constipation, diarrhea and nausea  Endocrine: Negative for heat intolerance and polydipsia  Genitourinary: Negative for flank pain, frequency and hematuria  Musculoskeletal: Negative for back pain and joint swelling  Skin: Negative for rash  Neurological: Negative for dizziness, numbness and headaches  Hematological: Does not bruise/bleed easily  Psychiatric/Behavioral: Negative for agitation and behavioral problems         Past Medical History     Past Medical History:   Diagnosis Date    Allergic rhinitis     Cardiac murmur     Depression     GERD (gastroesophageal reflux disease)     Hyperlipidemia     Hypertension     Hypothyroidism     IFG (impaired fasting glucose)     Obesity     Osteoarthritis of spine with radiculopathy, lumbosacral region 2019    Vitamin D deficiency        Past Surgical History     Past Surgical History:   Procedure Laterality Date     SECTION      3       Social History     Social History     Socioeconomic History    Marital status: /Civil Union     Spouse name: None    Number of children: None    Years of education: None    Highest education level: None   Occupational History    None   Social Needs    Financial resource strain: Not hard at all   Tereza-Junito insecurity:     Worry: Never true     Inability: Never true    Transportation needs:     Medical: No     Non-medical: No   Tobacco Use    Smoking status: Never Smoker    Smokeless tobacco: Never Used    Tobacco comment: no passive smoke exposure   Substance and Sexual Activity    Alcohol use: No    Drug use: No    Sexual activity: Yes     Partners: Male   Lifestyle    Physical activity:     Days per week: 0 days     Minutes per session: 0 min    Stress:  To some extent   Relationships    Social connections:     Talks on phone: More than three times a week     Gets together: More than three times a week     Attends [employer] service: More than 4 times per year     Active member of club or organization: No     Attends meetings of clubs or organizations: Never     Relationship status:     Intimate partner violence:     Fear of current or ex partner: No     Emotionally abused: No     Physically abused: No     Forced sexual activity: No   Other Topics Concern    None   Social History Narrative    None       Family History     Family History   Problem Relation Age of Onset    Heart disease Father     Breast cancer Sister        Current Medications       Current Outpatient Medications:     atorvastatin (LIPITOR) 20 mg tablet, Take 1 tablet (20 mg total) by mouth daily, Disp: 90 tablet, Rfl: 1    ROSANA 5-40 MG, Take 1 tablet by mouth daily, Disp: 90 tablet, Rfl: 1    citalopram (CeleXA) 10 mg tablet, Take 1 tablet (10 mg total) by mouth every evening, Disp: 90 tablet, Rfl: 1    fexofenadine (ALLEGRA ALLERGY) 180 MG tablet, take 1 tablet (180MG)  by oral route  every day, Disp: , Rfl:     fluticasone (FLONASE) 50 mcg/act nasal spray, as needed, Disp: , Rfl:     furosemide (LASIX) 20 mg tablet, Take 1 tablet (20 mg total) by mouth daily, Disp: 90 tablet, Rfl: 1    levothyroxine 112 mcg tablet, Take 1 tablet (112 mcg total) by mouth daily, Disp: 90 tablet, Rfl: 1    montelukast (SINGULAIR) 10 mg tablet, Take 1 tablet (10 mg total) by mouth every evening, Disp: 90 tablet, Rfl: 1    pantoprazole (PROTONIX) 40 mg tablet, Take 1 tablet (40 mg total) by mouth daily, Disp: 90 tablet, Rfl: 1    meloxicam (MOBIC) 7 5 mg tablet, Take 1 tablet (7 5 mg total) by mouth daily, Disp: 30 tablet, Rfl: 1     Allergies     Allergies   Allergen Reactions    No Active Allergies        Objective     /70   Pulse 86   Temp 98 4 °F (36 9 °C) (Tympanic)   Ht 5' 2 25" (1 581 m)   Wt 104 kg (230 lb)   SpO2 97%   Breastfeeding? No   BMI 41 73 kg/m²      Physical Exam   Constitutional: She is oriented to person, place, and time  She appears well-developed and well-nourished  HENT:   Head: Normocephalic  Right Ear: External ear normal    Left Ear: External ear normal    Eyes: Conjunctivae and EOM are normal  Right eye exhibits no discharge  Left eye exhibits no discharge  Neck: No JVD present  Cardiovascular: Normal rate, regular rhythm and normal heart sounds  Exam reveals no gallop  No murmur heard  Pulmonary/Chest: Effort normal  No respiratory distress  She has no wheezes  She has no rales  She exhibits no tenderness  Abdominal: She exhibits no mass  There is no tenderness  There is no rebound  Musculoskeletal: She exhibits no edema or tenderness  Neurological: She is alert and oriented to person, place, and time  Skin: No rash noted  No erythema  Psychiatric: She has a normal mood and affect  Warden Brayden MD  [address] [place] [address]

## 2019-08-27 LAB — HBA1C MFR BLD HPLC: 5.9 %

## 2019-08-28 PROBLEM — M47.27 OSTEOARTHRITIS OF SPINE WITH RADICULOPATHY, LUMBOSACRAL REGION: Status: ACTIVE | Noted: 2019-08-28

## 2019-08-28 PROBLEM — Z00.01 ENCOUNTER FOR WELL ADULT EXAM WITH ABNORMAL FINDINGS: Status: ACTIVE | Noted: 2019-08-26

## 2019-08-28 PROBLEM — Z00.01 ENCOUNTER FOR WELL ADULT EXAM WITH ABNORMAL FINDINGS: Status: ACTIVE | Noted: 2019-08-28

## 2019-08-28 PROBLEM — M47.27 OSTEOARTHRITIS OF SPINE WITH RADICULOPATHY, LUMBOSACRAL REGION: Status: ACTIVE | Noted: 2019-08-26

## 2019-08-28 NOTE — ASSESSMENT & PLAN NOTE
A chronic asymptomatic fair control continue current management discussed the patient low-salt diet important lose weight and she is due for blood work to check her BMP

## 2019-08-28 NOTE — ASSESSMENT & PLAN NOTE
A chronic asymptomatic continue with the Flonase nasal spray and continue with the Singulair 10 mg once a day and patient use the Allegra 180 mg 1 daily proper use of medication discussed with the patient

## 2019-08-28 NOTE — ASSESSMENT & PLAN NOTE
Advice and education were given regarding nutrition, aerobic exercises, weight bearing exercises, cardiovascular risk reduction, fall risk reduction, and age appropriate supplements  The patient was counseled regarding instructions for management, risk factor reductions, prognosis, risks and benefits of treatment options, patient and family education, and importance of compliance with treatment       Patient is due for mammogram ordered today the patient also due for shingle vaccine she decline it

## 2019-08-28 NOTE — ASSESSMENT & PLAN NOTE
Acute on chronic will start the patient on Mobic 7 5 mg once a day proper use of medication possible side effect discussed with the patient discussed the patient important lose weight and proper postural and proper exercise

## 2019-09-10 ENCOUNTER — ANNUAL EXAM (OUTPATIENT)
Dept: FAMILY MEDICINE CLINIC | Facility: CLINIC | Age: 59
End: 2019-09-10
Payer: COMMERCIAL

## 2019-09-10 VITALS
BODY MASS INDEX: 42.88 KG/M2 | HEART RATE: 76 BPM | OXYGEN SATURATION: 96 % | WEIGHT: 233 LBS | SYSTOLIC BLOOD PRESSURE: 120 MMHG | DIASTOLIC BLOOD PRESSURE: 80 MMHG | TEMPERATURE: 98.6 F | HEIGHT: 62 IN

## 2019-09-10 DIAGNOSIS — I10 ESSENTIAL HYPERTENSION: ICD-10-CM

## 2019-09-10 DIAGNOSIS — R22.31 LUMP IN ARMPIT, RIGHT: ICD-10-CM

## 2019-09-10 DIAGNOSIS — R73.01 IMPAIRED FASTING GLUCOSE: ICD-10-CM

## 2019-09-10 DIAGNOSIS — R20.2 NUMBNESS AND TINGLING: ICD-10-CM

## 2019-09-10 DIAGNOSIS — R20.0 NUMBNESS AND TINGLING: ICD-10-CM

## 2019-09-10 DIAGNOSIS — E03.9 ACQUIRED HYPOTHYROIDISM: ICD-10-CM

## 2019-09-10 DIAGNOSIS — E78.2 MIXED HYPERLIPIDEMIA: ICD-10-CM

## 2019-09-10 DIAGNOSIS — Z01.419 GYNECOLOGIC EXAM NORMAL: Primary | ICD-10-CM

## 2019-09-10 PROCEDURE — 99396 PREV VISIT EST AGE 40-64: CPT | Performed by: FAMILY MEDICINE

## 2019-09-10 PROCEDURE — G0145 SCR C/V CYTO,THINLAYER,RESCR: HCPCS | Performed by: FAMILY MEDICINE

## 2019-09-10 PROCEDURE — 99214 OFFICE O/P EST MOD 30 MIN: CPT | Performed by: FAMILY MEDICINE

## 2019-09-10 PROCEDURE — 87624 HPV HI-RISK TYP POOLED RSLT: CPT | Performed by: FAMILY MEDICINE

## 2019-09-10 RX ORDER — IBUPROFEN 600 MG/1
600 TABLET ORAL EVERY 6 HOURS PRN
COMMUNITY
End: 2019-10-17 | Stop reason: SDUPTHER

## 2019-09-10 RX ORDER — IBUPROFEN 600 MG/1
600 TABLET ORAL EVERY 6 HOURS PRN
Qty: 30 TABLET | Refills: 2 | Status: CANCELLED | OUTPATIENT
Start: 2019-09-10

## 2019-09-10 RX ORDER — METHOCARBAMOL 750 MG/1
TABLET, FILM COATED ORAL
COMMUNITY
Start: 2019-09-09 | End: 2019-10-17 | Stop reason: SDUPTHER

## 2019-09-10 NOTE — PROGRESS NOTES
Subjective    Fina Ye is a 63 y o  female here for a routine gynecologic exam       Current complaints:   Patient here for her Pap smear and she deny any abdomen pain no flank pain no vaginal bleeding no vaginal discharge patient is post menopause the no previous abnormal Pap smear     Gynecologic History  No LMP recorded  Patient is postmenopausal   Contraception: no  Last Pap: 1-4-16  Results were: normal  Last mammogram: 11-24-18  Results were: normal  Last colonoscopy: 7-10-18  Results were: normal      The following portions of the patient's history were reviewed and updated as appropriate: allergies, current medications, past family history, past medical history, past social history, past surgical history and problem list     Review of Systems   Constitutional: Negative for chills, fatigue and fever  HENT: Negative for sore throat  Respiratory: Negative for cough and shortness of breath  Cardiovascular: Negative for chest pain and leg swelling  Gastrointestinal: Negative for abdominal distention, abdominal pain, blood in stool, constipation, diarrhea and rectal pain  Endocrine: Negative for heat intolerance  Genitourinary: Negative for decreased urine volume, difficulty urinating, dysuria, frequency, genital sores, hematuria, menstrual problem, pelvic pain, vaginal bleeding, vaginal discharge and vaginal pain  Musculoskeletal: Negative for back pain and gait problem  Neurological: Positive for numbness  Negative for tremors and headaches  Psychiatric/Behavioral: Negative for agitation and behavioral problems  Objective    Physical Exam   Constitutional: She is oriented to person, place, and time  She appears well-developed  HENT:   Right Ear: External ear normal    Left Ear: External ear normal    Eyes: Pupils are equal, round, and reactive to light  EOM are normal    Neck: No thyromegaly present  Cardiovascular: Normal rate, regular rhythm and normal heart sounds   Exam reveals no friction rub  No murmur heard  Pulmonary/Chest: She has no wheezes  She has no rales  She exhibits no tenderness  Abdominal: She exhibits no distension and no mass  There is no tenderness  No hernia  Hernia confirmed negative in the right inguinal area and confirmed negative in the left inguinal area  Genitourinary: There is no rash on the right labia  There is no rash on the left labia  Cervix exhibits no motion tenderness  Right adnexum displays no mass and no tenderness  Left adnexum displays no mass and no tenderness  No tenderness or bleeding in the vagina  No foreign body in the vagina  No vaginal discharge found  Musculoskeletal: Normal range of motion  She exhibits no edema  Lymphadenopathy:        Right: No inguinal adenopathy present  Left: No inguinal adenopathy present  Neurological: She is alert and oriented to person, place, and time  Skin: Skin is warm  Assessment     Healthy female exam       Encounter Diagnosis     ICD-10-CM    1  Gynecologic exam normal Z01 419 Liquid-based pap, screening     HPV High Risk     HPV High Risk   2  Impaired fasting glucose R73 01 Vitamin B12     KEVIN Screen w/ Reflex to Titer/Pattern     C-reactive protein     RPR     Human Immunodeficiency Virus 1/2 Antigen / Antibody ( Fourth Generation) with Reflex Testing   3  Acquired hypothyroidism E03 9 Vitamin B12     KEVIN Screen w/ Reflex to Titer/Pattern     C-reactive protein     RPR     Human Immunodeficiency Virus 1/2 Antigen / Antibody ( Fourth Generation) with Reflex Testing   4  Essential hypertension I10 Vitamin B12     KEVIN Screen w/ Reflex to Titer/Pattern     C-reactive protein     RPR     Human Immunodeficiency Virus 1/2 Antigen / Antibody ( Fourth Generation) with Reflex Testing   5   Mixed hyperlipidemia E78 2 Vitamin B12     KEVIN Screen w/ Reflex to Titer/Pattern     C-reactive protein     RPR     Human Immunodeficiency Virus 1/2 Antigen / Antibody ( Fourth Generation) with Reflex Testing   6  Numbness and tingling R20 0 EMG 2 limb lower extremity    R20 2 EMG 2 Limb Upper Extremity     Vitamin B12     EKVIN Screen w/ Reflex to Titer/Pattern     C-reactive protein     RPR     Human Immunodeficiency Virus 1/2 Antigen / Antibody ( Fourth Generation) with Reflex Testing   7  Lump in armpit, right R22 31 US extremity soft tissue     Plan     Education reviewed: calcium supplements, depression evaluation, low fat, low cholesterol diet and weight bearing exercise

## 2019-09-11 ENCOUNTER — HOSPITAL ENCOUNTER (OUTPATIENT)
Dept: MAMMOGRAPHY | Facility: HOSPITAL | Age: 59
Discharge: HOME | End: 2019-09-11

## 2019-09-11 VITALS — WEIGHT: 233 LBS | HEIGHT: 62 IN | BODY MASS INDEX: 42.88 KG/M2

## 2019-09-11 DIAGNOSIS — Z12.31 ENCOUNTER FOR SCREENING MAMMOGRAM FOR BREAST CANCER: ICD-10-CM

## 2019-09-11 LAB
HPV HR 12 DNA CVX QL NAA+PROBE: NEGATIVE
HPV16 DNA CVX QL NAA+PROBE: NEGATIVE
HPV18 DNA CVX QL NAA+PROBE: NEGATIVE
LAB AP GYN PRIMARY INTERPRETATION: NORMAL
Lab: NORMAL

## 2019-09-12 ENCOUNTER — TELEPHONE (OUTPATIENT)
Dept: FAMILY MEDICINE CLINIC | Facility: CLINIC | Age: 59
End: 2019-09-12

## 2019-09-12 ENCOUNTER — TRANSCRIBE ORDERS (OUTPATIENT)
Dept: ADMINISTRATIVE | Facility: HOSPITAL | Age: 59
End: 2019-09-12

## 2019-09-12 DIAGNOSIS — R22.30 AXILLARY MASS, UNSPECIFIED LATERALITY: Primary | ICD-10-CM

## 2019-09-12 DIAGNOSIS — Z12.39 BREAST SCREENING, UNSPECIFIED: ICD-10-CM

## 2019-09-12 PROBLEM — Z01.419 GYNECOLOGIC EXAM NORMAL: Status: ACTIVE | Noted: 2019-09-10

## 2019-09-12 PROBLEM — R22.31 LUMP IN ARMPIT, RIGHT: Status: ACTIVE | Noted: 2019-09-10

## 2019-09-12 PROBLEM — R22.31 LUMP IN ARMPIT, RIGHT: Status: ACTIVE | Noted: 2019-09-12

## 2019-09-12 PROBLEM — Z01.419 GYNECOLOGIC EXAM NORMAL: Status: ACTIVE | Noted: 2019-09-12

## 2019-09-12 NOTE — PROGRESS NOTES
Subjective:   Chief Complaint   Patient presents with    Gynecologic Exam    Follow-up     chronic conditions    Other     hands and feet tingle        Patient ID: Sherly Griffiths is a 63 y o  female  Patient office for her gyn exam follow-up with a chronic condition and she is concerned about numbness tingling in bilateral upper and lower extremity and she feel it the from finger is up to her forearm and also on the lower extremity from the toes up to her knees and patient in she feel the symptom has been going on and off for while she cannot prescribed like is needed burning tingling or something Seroquel and the her skin and no headache no blurred vision no numbness notice no weakness no lateralized of the symptom no fever no weight change no head trauma no lose control of the urine or stool and no rash and no muscle atrophy  Patient's history of hypothyroidism on levothyroxine 112 mcg tolerated well without any side effect deny any mood swing no heat or cold intolerance patient's history of hyperlipidemia on atorvastatin 20 mg tolerated well deny any chest pain short of breath no palpitation no TIA symptom patient was history of impaired fasting glucose try to controlled with the low carb diet deny any increased thirsty increased frequency urination no dizziness and no abdomen pain  Recent blood work discussed with the patient      The following portions of the patient's history were reviewed and updated as appropriate: allergies, current medications, past family history, past medical history, past social history, past surgical history and problem list     Review of Systems   Constitutional: Negative for fatigue and fever  HENT: Negative for ear pain, sinus pressure, sinus pain and sore throat  Eyes: Negative for pain and redness  Respiratory: Negative for cough, chest tightness and shortness of breath  Cardiovascular: Negative for chest pain, palpitations and leg swelling     Gastrointestinal: Negative for abdominal pain, blood in stool, constipation, diarrhea and nausea  Genitourinary: Negative for flank pain, frequency and hematuria  Musculoskeletal: Negative for back pain and joint swelling  Skin: Negative for rash  Neurological: Positive for numbness  Negative for dizziness, tremors, seizures, weakness, light-headedness and headaches  Numbness in bilateral upper and lower extremity   Hematological: Does not bruise/bleed easily  Objective:  Vitals:    09/10/19 1048   BP: 120/80   Pulse: 76   Temp: 98 6 °F (37 °C)   TempSrc: Tympanic   SpO2: 96%   Weight: 106 kg (233 lb)   Height: 5' 2" (1 575 m)      Physical Exam   Constitutional: She is oriented to person, place, and time  She appears well-developed and well-nourished  HENT:   Head: Normocephalic  Right Ear: External ear normal    Left Ear: External ear normal    Eyes: Conjunctivae and EOM are normal  Right eye exhibits no discharge  Left eye exhibits no discharge  Neck: No JVD present  Cardiovascular: Normal rate, regular rhythm and normal heart sounds  Exam reveals no gallop  No murmur heard  Pulmonary/Chest: Effort normal  No respiratory distress  She has no wheezes  She has no rales  She exhibits no tenderness  Abdominal: She exhibits no mass  There is no tenderness  There is no rebound  Musculoskeletal: She exhibits no edema or tenderness  Neurological: She is alert and oriented to person, place, and time  She displays normal reflexes  No cranial nerve deficit or sensory deficit  She exhibits normal muscle tone  Coordination normal    Skin: No rash noted  No erythema           Assessment/Plan:    Gynecologic exam normal  PAP is done today     Lump in armpit, right  A new diagnosis finding on physical exam palpable lump at the 8:00 a m  on the right armpit plan to do ultrasound of the area discussed with the patient    Numbness and tingling  A new diagnosis symptomatic in bilateral upper and lower extremity patient does have a multiple risk including obesity impaired fasting glucose plan to do workup including the checking vitamin B12 we which check RPR HIV we will check KEVIN C-reactive protein also plan for EMG study of upper and lower extremity    Hypothyroidism  Chronic asymptomatic fair control continue current dose of levothyroxine 112 mcg once a day proper use of medication discussed with the patient    Impaired fasting glucose  Chronic asymptomatic uncontrolled we encouraged patient to lose weight increased physical activity and low carb diet discussed with the patient    Mixed hyperlipidemia  Chronic asymptomatic fair control continue with atorvastatin 20 mg once a day proper use of medication possible side effect discussed with the patient also encouraged patient to follow up with the low-fat diet       Diagnoses and all orders for this visit:    Gynecologic exam normal  -     Liquid-based pap, screening  -     HPV High Risk; Future  -     HPV High Risk    Impaired fasting glucose  -     Vitamin B12; Future  -     KEVIN Screen w/ Reflex to Titer/Pattern; Future  -     C-reactive protein; Future  -     RPR; Future  -     Human Immunodeficiency Virus 1/2 Antigen / Antibody ( Fourth Generation) with Reflex Testing; Future    Acquired hypothyroidism  -     Vitamin B12; Future  -     KEVIN Screen w/ Reflex to Titer/Pattern; Future  -     C-reactive protein; Future  -     RPR; Future  -     Human Immunodeficiency Virus 1/2 Antigen / Antibody ( Fourth Generation) with Reflex Testing; Future    Essential hypertension  -     Vitamin B12; Future  -     KEVIN Screen w/ Reflex to Titer/Pattern; Future  -     C-reactive protein; Future  -     RPR; Future  -     Human Immunodeficiency Virus 1/2 Antigen / Antibody ( Fourth Generation) with Reflex Testing; Future    Mixed hyperlipidemia  -     Vitamin B12; Future  -     KEVIN Screen w/ Reflex to Titer/Pattern; Future  -     C-reactive protein; Future  -     RPR;  Future  -     Human Immunodeficiency Virus 1/2 Antigen / Antibody ( Fourth Generation) with Reflex Testing; Future    Numbness and tingling  -     EMG 2 limb lower extremity; Future  -     EMG 2 Limb Upper Extremity; Future  -     Vitamin B12; Future  -     KEVIN Screen w/ Reflex to Titer/Pattern; Future  -     C-reactive protein; Future  -     RPR; Future  -     Human Immunodeficiency Virus 1/2 Antigen / Antibody ( Fourth Generation) with Reflex Testing; Future    Lump in armpit, right  -     US extremity soft tissue; Future    Other orders  -     methocarbamol (ROBAXIN) 750 mg tablet  -     ibuprofen (MOTRIN) 600 mg tablet; Take 600 mg by mouth every 6 (six) hours as needed for mild pain  -     Cancel: ibuprofen (MOTRIN) 600 mg tablet; Take 1 tablet (600 mg total) by mouth every 6 (six) hours as needed for mild pain  -     Cancel: ibuprofen (MOTRIN) 600 mg tablet;  Take 1 tablet (600 mg total) by mouth every 6 (six) hours as needed for mild pain

## 2019-09-12 NOTE — ASSESSMENT & PLAN NOTE
Chronic asymptomatic uncontrolled we encouraged patient to lose weight increased physical activity and low carb diet discussed with the patient

## 2019-09-12 NOTE — ASSESSMENT & PLAN NOTE
Chronic asymptomatic fair control continue current dose of levothyroxine 112 mcg once a day proper use of medication discussed with the patient

## 2019-09-12 NOTE — ASSESSMENT & PLAN NOTE
A new diagnosis symptomatic in bilateral upper and lower extremity patient does have a multiple risk including obesity impaired fasting glucose plan to do workup including the checking vitamin B12 we which check RPR HIV we will check KEVIN C-reactive protein also plan for EMG study of upper and lower extremity

## 2019-09-12 NOTE — ASSESSMENT & PLAN NOTE
A new diagnosis finding on physical exam palpable lump at the 8:00 a m  on the right armpit plan to do ultrasound of the area discussed with the patient

## 2019-09-12 NOTE — ASSESSMENT & PLAN NOTE
Chronic asymptomatic fair control continue with atorvastatin 20 mg once a day proper use of medication possible side effect discussed with the patient also encouraged patient to follow up with the low-fat diet

## 2019-09-13 ENCOUNTER — HOSPITAL ENCOUNTER (OUTPATIENT)
Dept: ULTRASOUND IMAGING | Facility: CLINIC | Age: 59
Discharge: HOME | End: 2019-09-13
Payer: COMMERCIAL

## 2019-09-13 ENCOUNTER — TELEPHONE (OUTPATIENT)
Dept: FAMILY MEDICINE CLINIC | Facility: CLINIC | Age: 59
End: 2019-09-13

## 2019-09-13 ENCOUNTER — HOSPITAL ENCOUNTER (OUTPATIENT)
Dept: MAMMOGRAPHY | Facility: CLINIC | Age: 59
Discharge: HOME | End: 2019-09-13
Payer: COMMERCIAL

## 2019-09-13 VITALS — HEIGHT: 64 IN | WEIGHT: 233 LBS | BODY MASS INDEX: 39.78 KG/M2

## 2019-09-13 DIAGNOSIS — Z12.39 BREAST SCREENING, UNSPECIFIED: ICD-10-CM

## 2019-09-13 DIAGNOSIS — R22.30 AXILLARY MASS, UNSPECIFIED LATERALITY: ICD-10-CM

## 2019-09-13 PROCEDURE — 76642 ULTRASOUND BREAST LIMITED: CPT

## 2019-09-13 PROCEDURE — 77066 DX MAMMO INCL CAD BI: CPT

## 2019-09-13 PROCEDURE — G0279 TOMOSYNTHESIS, MAMMO: HCPCS

## 2019-09-19 ENCOUNTER — HOSPITAL ENCOUNTER (OUTPATIENT)
Dept: NEUROLOGY | Facility: CLINIC | Age: 59
Discharge: HOME | End: 2019-09-19
Payer: COMMERCIAL

## 2019-09-19 DIAGNOSIS — R20.2 NUMBNESS AND TINGLING: ICD-10-CM

## 2019-09-19 DIAGNOSIS — R20.0 NUMBNESS AND TINGLING: ICD-10-CM

## 2019-09-19 PROCEDURE — 95886 MUSC TEST DONE W/N TEST COMP: CPT | Performed by: PHYSICAL MEDICINE & REHABILITATION

## 2019-09-19 PROCEDURE — 95913 NRV CNDJ TEST 13/> STUDIES: CPT | Performed by: PHYSICAL MEDICINE & REHABILITATION

## 2019-09-23 DIAGNOSIS — G56.02 CARPAL TUNNEL SYNDROME OF LEFT WRIST: Primary | ICD-10-CM

## 2019-09-25 ENCOUNTER — HOSPITAL ENCOUNTER (OUTPATIENT)
Dept: NEUROLOGY | Facility: CLINIC | Age: 59
Discharge: HOME | End: 2019-09-25
Payer: COMMERCIAL

## 2019-09-25 DIAGNOSIS — R20.2 NUMBNESS AND TINGLING: ICD-10-CM

## 2019-09-25 DIAGNOSIS — R20.0 NUMBNESS AND TINGLING: ICD-10-CM

## 2019-09-25 PROCEDURE — 95886 MUSC TEST DONE W/N TEST COMP: CPT | Performed by: PHYSICAL MEDICINE & REHABILITATION

## 2019-09-25 PROCEDURE — 95911 NRV CNDJ TEST 9-10 STUDIES: CPT | Performed by: PHYSICAL MEDICINE & REHABILITATION

## 2019-10-01 LAB — EXTERNAL HIV SCREEN: NORMAL

## 2019-10-17 ENCOUNTER — OFFICE VISIT (OUTPATIENT)
Dept: FAMILY MEDICINE CLINIC | Facility: CLINIC | Age: 59
End: 2019-10-17
Payer: COMMERCIAL

## 2019-10-17 VITALS
TEMPERATURE: 98.2 F | SYSTOLIC BLOOD PRESSURE: 130 MMHG | WEIGHT: 235 LBS | OXYGEN SATURATION: 95 % | BODY MASS INDEX: 43.24 KG/M2 | DIASTOLIC BLOOD PRESSURE: 80 MMHG | HEIGHT: 62 IN | HEART RATE: 75 BPM

## 2019-10-17 DIAGNOSIS — R73.01 IMPAIRED FASTING GLUCOSE: ICD-10-CM

## 2019-10-17 DIAGNOSIS — G56.02 CARPAL TUNNEL SYNDROME OF LEFT WRIST: ICD-10-CM

## 2019-10-17 DIAGNOSIS — K21.9 GASTROESOPHAGEAL REFLUX DISEASE WITHOUT ESOPHAGITIS: ICD-10-CM

## 2019-10-17 DIAGNOSIS — M47.27 OSTEOARTHRITIS OF SPINE WITH RADICULOPATHY, LUMBOSACRAL REGION: ICD-10-CM

## 2019-10-17 DIAGNOSIS — F32.A DEPRESSIVE DISORDER: ICD-10-CM

## 2019-10-17 DIAGNOSIS — L03.116 CELLULITIS OF FOOT, LEFT: Primary | ICD-10-CM

## 2019-10-17 DIAGNOSIS — E03.9 ACQUIRED HYPOTHYROIDISM: ICD-10-CM

## 2019-10-17 PROCEDURE — 3008F BODY MASS INDEX DOCD: CPT | Performed by: FAMILY MEDICINE

## 2019-10-17 PROCEDURE — 99214 OFFICE O/P EST MOD 30 MIN: CPT | Performed by: FAMILY MEDICINE

## 2019-10-17 RX ORDER — SULFAMETHOXAZOLE AND TRIMETHOPRIM 800; 160 MG/1; MG/1
1 TABLET ORAL 2 TIMES DAILY
Qty: 20 TABLET | Refills: 0 | Status: SHIPPED | OUTPATIENT
Start: 2019-10-17 | End: 2019-10-27

## 2019-10-17 RX ORDER — DIAZEPAM 10 MG/1
TABLET ORAL
COMMUNITY
End: 2020-09-28 | Stop reason: ALTCHOICE

## 2019-10-17 RX ORDER — IBUPROFEN 600 MG/1
600 TABLET ORAL EVERY 6 HOURS PRN
Qty: 30 TABLET | Refills: 2 | Status: SHIPPED | OUTPATIENT
Start: 2019-10-17 | End: 2020-04-22 | Stop reason: SDUPTHER

## 2019-10-17 RX ORDER — CITALOPRAM 20 MG/1
20 TABLET ORAL DAILY
Qty: 90 TABLET | Refills: 0 | Status: SHIPPED | OUTPATIENT
Start: 2019-10-17 | End: 2020-01-15 | Stop reason: SDUPTHER

## 2019-10-17 NOTE — PROGRESS NOTES
Subjective:   Chief Complaint   Patient presents with    Follow-up     chronic conditions        Patient ID: Daphnie Lopez is a 63 y o  female  Patient and office follow-up with a chronic condition she been complaining the numbness in her bilateral upper extremity for what we did EMG study it was a positive for carpal tunnel on her left side we did prescribe the splint she been wearing at nighttime patient notice improvement in her symptom and patient she did have the her bite on her left foot the by her dog and she have a pain and the redness in the area at the bottom of the foot the no fever no chills no limited range of the motion no discharge her last tetanus shot was 2017  Patient who known to have history of depression she is on citalopram 10 mg once a day she has been going through a lot the with her family affect her sleeping her appetite in her mood he feel tired and she just 1 the cry she does not have interest the on her daily life and but she deny any suicide attempt or ideation  Patient was history of GERD deny any abdomen pain no heartburn nausea vomiting no diarrhea no weight change and she try to controlled with diet  Recent blood work workup for numbness discussed with the patient also EMG study of upper and lower extremity discussed with the patient      The following portions of the patient's history were reviewed and updated as appropriate: allergies, current medications, past family history, past medical history, past social history, past surgical history and problem list     Review of Systems   Constitutional: Negative for fatigue and fever  HENT: Negative for ear pain, sinus pressure, sinus pain and sore throat  Eyes: Negative for pain and redness  Respiratory: Negative for cough, chest tightness and shortness of breath  Cardiovascular: Negative for chest pain, palpitations and leg swelling     Gastrointestinal: Negative for abdominal pain, blood in stool, constipation, diarrhea and nausea  Genitourinary: Negative for flank pain, frequency and hematuria  Musculoskeletal: Negative for back pain and joint swelling  Skin: Negative for rash  Redness and pain in left foot   Neurological: Negative for dizziness, numbness and headaches  Hematological: Does not bruise/bleed easily  Psychiatric/Behavioral: Negative for decreased concentration, self-injury and suicidal ideas  Objective:  Vitals:    10/17/19 1304   BP: 130/80   Pulse: 75   Temp: 98 2 °F (36 8 °C)   TempSrc: Tympanic   SpO2: 95%   Weight: 107 kg (235 lb)   Height: 5' 2" (1 575 m)      Physical Exam   Constitutional: She is oriented to person, place, and time  She appears well-developed and well-nourished  HENT:   Head: Normocephalic  Right Ear: External ear normal    Left Ear: External ear normal    Eyes: Conjunctivae and EOM are normal  Right eye exhibits no discharge  Left eye exhibits no discharge  Neck: No JVD present  Cardiovascular: Normal rate, regular rhythm and normal heart sounds  Exam reveals no gallop  No murmur heard  Pulmonary/Chest: Effort normal  No respiratory distress  She has no wheezes  She has no rales  She exhibits no tenderness  Abdominal: She exhibits no mass  There is no tenderness  There is no rebound  Musculoskeletal: She exhibits no edema or tenderness  Neurological: She is alert and oriented to person, place, and time  Skin: No rash noted  There is erythema     Erythema swelling and tenderness on bottomof left foot ,no discharge         Assessment/Plan:    Carpal tunnel syndrome of left wrist  A new diagnosis abnormal EMG of the in the left upper extremity patient using the splint proper use discussed with the patient if there is no improvement to call the office    Depressive disorder  Chronic symptomatic uncontrolled increase citalopram to 20 mg once a day proper use of medication discussed with the patient    Cellulitis of foot, left  A new diagnosis symptomatic start her on Bactrim DS 1 tablet twice a day for 10 days proper use of medication possible side effect discussed with the patient    Gastroesophageal reflux disease  Chronic asymptomatic fair control encouraged patient to lose weight and avoid provoke food       Diagnoses and all orders for this visit:    Cellulitis of foot, left  -     sulfamethoxazole-trimethoprim (BACTRIM DS) 800-160 mg per tablet; Take 1 tablet by mouth 2 (two) times a day for 10 days    Depressive disorder  -     citalopram (CeleXA) 20 mg tablet; Take 1 tablet (20 mg total) by mouth daily  -     CBC and differential; Future  -     Basic metabolic panel; Future  -     Lipid panel; Future  -     TSH, 3rd generation with Free T4 reflex; Future  -     Hemoglobin A1C; Future    Carpal tunnel syndrome of left wrist    Gastroesophageal reflux disease without esophagitis  -     CBC and differential; Future  -     Basic metabolic panel; Future  -     Lipid panel; Future  -     TSH, 3rd generation with Free T4 reflex; Future  -     Hemoglobin A1C; Future    Impaired fasting glucose  -     CBC and differential; Future  -     Basic metabolic panel; Future  -     Lipid panel; Future  -     TSH, 3rd generation with Free T4 reflex; Future  -     Hemoglobin A1C; Future    Acquired hypothyroidism  -     CBC and differential; Future  -     Basic metabolic panel; Future  -     Lipid panel; Future  -     TSH, 3rd generation with Free T4 reflex; Future  -     Hemoglobin A1C; Future    Osteoarthritis of spine with radiculopathy, lumbosacral region  -     ibuprofen (MOTRIN) 600 mg tablet; Take 1 tablet (600 mg total) by mouth every 6 (six) hours as needed for mild pain    Other orders  -     diazepam (VALIUM) 10 mg tablet; Valium 10 mg tablet   Take 2 tablets as needed by oral route

## 2019-10-17 NOTE — ASSESSMENT & PLAN NOTE
Chronic symptomatic uncontrolled increase citalopram to 20 mg once a day proper use of medication discussed with the patient

## 2019-10-17 NOTE — ASSESSMENT & PLAN NOTE
A new diagnosis symptomatic start her on Bactrim DS 1 tablet twice a day for 10 days proper use of medication possible side effect discussed with the patient

## 2019-12-18 ENCOUNTER — HOSPITAL ENCOUNTER (OUTPATIENT)
Dept: ULTRASOUND IMAGING | Facility: HOSPITAL | Age: 59
Discharge: HOME | End: 2019-12-18
Payer: COMMERCIAL

## 2019-12-18 ENCOUNTER — OFFICE VISIT (OUTPATIENT)
Dept: FAMILY MEDICINE CLINIC | Facility: CLINIC | Age: 59
End: 2019-12-18
Payer: COMMERCIAL

## 2019-12-18 VITALS
BODY MASS INDEX: 47.12 KG/M2 | TEMPERATURE: 98.9 F | WEIGHT: 240 LBS | HEART RATE: 84 BPM | DIASTOLIC BLOOD PRESSURE: 80 MMHG | OXYGEN SATURATION: 96 % | HEIGHT: 60 IN | SYSTOLIC BLOOD PRESSURE: 130 MMHG

## 2019-12-18 DIAGNOSIS — Z23 NEED FOR INFLUENZA VACCINATION: ICD-10-CM

## 2019-12-18 DIAGNOSIS — R10.11 RUQ PAIN: Primary | ICD-10-CM

## 2019-12-18 DIAGNOSIS — R10.11 RUQ PAIN: ICD-10-CM

## 2019-12-18 PROCEDURE — 90471 IMMUNIZATION ADMIN: CPT

## 2019-12-18 PROCEDURE — 76705 ECHO EXAM OF ABDOMEN: CPT

## 2019-12-18 PROCEDURE — 90686 IIV4 VACC NO PRSV 0.5 ML IM: CPT

## 2019-12-18 PROCEDURE — 3008F BODY MASS INDEX DOCD: CPT | Performed by: FAMILY MEDICINE

## 2019-12-18 PROCEDURE — 99214 OFFICE O/P EST MOD 30 MIN: CPT | Performed by: FAMILY MEDICINE

## 2019-12-18 NOTE — PROGRESS NOTES
Subjective:   Chief Complaint   Patient presents with    Abdominal Pain     upper    Vomiting    Dizziness        Patient ID: Eladio Medrano is a 63 y o  female  Patient concerned about abdomen pain in the right upper quadrant her she cry describe it as a 6/59 colicky comes and goes associated with the nausea known episode of vomiting no fever no chills no change in the weight no diarrhea no constipation no abdomen distension no sick contact the patient and attending certain food aggravate her pain and she had multiple risk factor for gallbladder disease      The following portions of the patient's history were reviewed and updated as appropriate: allergies, current medications, past family history, past medical history, past social history, past surgical history and problem list     Review of Systems   Constitutional: Negative for fatigue and fever  HENT: Negative for ear pain, sinus pressure, sinus pain and sore throat  Eyes: Negative for pain and redness  Respiratory: Negative for cough, chest tightness and shortness of breath  Cardiovascular: Negative for chest pain, palpitations and leg swelling  Gastrointestinal: Positive for abdominal pain, nausea and vomiting  Negative for blood in stool, constipation and diarrhea  Genitourinary: Negative for flank pain, frequency and hematuria  Musculoskeletal: Negative for back pain and joint swelling  Skin: Negative for rash  Neurological: Negative for dizziness, numbness and headaches  Hematological: Does not bruise/bleed easily  Objective:  Vitals:    12/18/19 1343 12/18/19 1418   BP: 140/90 130/80   Pulse: 84    Temp: 98 9 °F (37 2 °C)    TempSrc: Tympanic    SpO2: 96%    Weight: 109 kg (240 lb)    Height: 5' (1 524 m)       Physical Exam   Constitutional: She is oriented to person, place, and time  She appears well-developed and well-nourished  HENT:   Head: Normocephalic     Right Ear: External ear normal    Left Ear: External ear normal    Eyes: Conjunctivae and EOM are normal  Right eye exhibits no discharge  Left eye exhibits no discharge  Neck: No JVD present  Cardiovascular: Normal rate, regular rhythm and normal heart sounds  Exam reveals no gallop  No murmur heard  Pulmonary/Chest: Effort normal  No respiratory distress  She has no wheezes  She has no rales  She exhibits no tenderness  Abdominal: She exhibits no mass  There is tenderness in the right upper quadrant  There is no rigidity, no rebound and no guarding  Musculoskeletal: She exhibits no edema or tenderness  Neurological: She is alert and oriented to person, place, and time  Skin: No rash noted  No erythema  Assessment/Plan:    RUQ pain  Acute symptomatic will order stat US of RUQ to R/O cholecystitis   Proper diet discuss with patient if it get worse to go to ER       Diagnoses and all orders for this visit:    RUQ pain  -     US right upper quadrant;  Future    Need for influenza vaccination  -     FLUZONE: influenza vaccine, quadrivalent, 0 5 mL

## 2019-12-19 ENCOUNTER — TELEPHONE (OUTPATIENT)
Dept: FAMILY MEDICINE CLINIC | Facility: CLINIC | Age: 59
End: 2019-12-19

## 2019-12-19 DIAGNOSIS — K76.0 FATTY LIVER DISEASE, NONALCOHOLIC: Primary | ICD-10-CM

## 2019-12-19 NOTE — TELEPHONE ENCOUNTER
----- Message from Connor Coburn MD sent at 12/18/2019  4:23 PM EST -----  Fatty liver No evidence of cholelithiasis or cholecystitis    Will refer to GI

## 2019-12-19 NOTE — RESULT ENCOUNTER NOTE
Spoke with patient per dr Serina Saez  Order placed and mailed to patient  Patient will schedule apt after the holidays per patient

## 2020-01-15 ENCOUNTER — OFFICE VISIT (OUTPATIENT)
Dept: FAMILY MEDICINE CLINIC | Facility: CLINIC | Age: 60
End: 2020-01-15
Payer: COMMERCIAL

## 2020-01-15 VITALS
DIASTOLIC BLOOD PRESSURE: 80 MMHG | BODY MASS INDEX: 44.35 KG/M2 | HEIGHT: 62 IN | OXYGEN SATURATION: 97 % | WEIGHT: 241 LBS | HEART RATE: 79 BPM | SYSTOLIC BLOOD PRESSURE: 120 MMHG | TEMPERATURE: 99 F

## 2020-01-15 DIAGNOSIS — E66.01 MORBID OBESITY WITH BMI OF 40.0-44.9, ADULT (HCC): ICD-10-CM

## 2020-01-15 DIAGNOSIS — E55.9 VITAMIN D DEFICIENCY: ICD-10-CM

## 2020-01-15 DIAGNOSIS — J30.81 ALLERGIC RHINITIS DUE TO ANIMAL HAIR AND DANDER: ICD-10-CM

## 2020-01-15 DIAGNOSIS — K21.9 GASTROESOPHAGEAL REFLUX DISEASE WITHOUT ESOPHAGITIS: Primary | ICD-10-CM

## 2020-01-15 DIAGNOSIS — I10 ESSENTIAL HYPERTENSION: ICD-10-CM

## 2020-01-15 DIAGNOSIS — R60.0 LOWER EXTREMITY EDEMA: ICD-10-CM

## 2020-01-15 DIAGNOSIS — E03.9 ACQUIRED HYPOTHYROIDISM: ICD-10-CM

## 2020-01-15 DIAGNOSIS — E78.2 MIXED HYPERLIPIDEMIA: ICD-10-CM

## 2020-01-15 DIAGNOSIS — F32.A DEPRESSIVE DISORDER: ICD-10-CM

## 2020-01-15 PROCEDURE — 3008F BODY MASS INDEX DOCD: CPT | Performed by: FAMILY MEDICINE

## 2020-01-15 PROCEDURE — 3079F DIAST BP 80-89 MM HG: CPT | Performed by: FAMILY MEDICINE

## 2020-01-15 PROCEDURE — 99214 OFFICE O/P EST MOD 30 MIN: CPT | Performed by: FAMILY MEDICINE

## 2020-01-15 PROCEDURE — 3074F SYST BP LT 130 MM HG: CPT | Performed by: FAMILY MEDICINE

## 2020-01-15 RX ORDER — AMLODIPINE BESYLATE AND OLMESARTAN MEDOXOMIL 5; 40 MG/1; MG/1
1 TABLET, FILM COATED ORAL DAILY
Qty: 90 TABLET | Refills: 0 | Status: SHIPPED | OUTPATIENT
Start: 2020-01-15 | End: 2020-04-22 | Stop reason: SDUPTHER

## 2020-01-15 RX ORDER — CITALOPRAM 20 MG/1
20 TABLET ORAL DAILY
Qty: 90 TABLET | Refills: 0 | Status: SHIPPED | OUTPATIENT
Start: 2020-01-15 | End: 2020-04-06

## 2020-01-15 RX ORDER — FAMOTIDINE 20 MG/1
20 TABLET, FILM COATED ORAL 2 TIMES DAILY
Qty: 60 TABLET | Refills: 0 | Status: SHIPPED | OUTPATIENT
Start: 2020-01-15 | End: 2020-04-22 | Stop reason: SDUPTHER

## 2020-01-15 RX ORDER — MONTELUKAST SODIUM 10 MG/1
10 TABLET ORAL EVERY EVENING
Qty: 90 TABLET | Refills: 0 | Status: SHIPPED | OUTPATIENT
Start: 2020-01-15 | End: 2020-04-22 | Stop reason: SDUPTHER

## 2020-01-15 RX ORDER — FUROSEMIDE 20 MG/1
20 TABLET ORAL DAILY
Qty: 90 TABLET | Refills: 0 | Status: SHIPPED | OUTPATIENT
Start: 2020-01-15 | End: 2020-04-22 | Stop reason: SDUPTHER

## 2020-01-15 NOTE — PROGRESS NOTES
Subjective:   Chief Complaint   Patient presents with    Follow-up     chronic conditions        Patient ID: Jen Augustine is a 63 y o  female  Patient office follow-up with a chronic condition patient who known to have history of GERD and she been trying to controlled with diet recently she been having acid reflex and the itchy throat the sore throat and it get worse at nighttime  And sometimes is cough dry and no abdomen pain no vomiting no do diarrhea no abdomen distension patient trying to lose weight but not successful she been trying different kind of diet and not the happy with the result we discussed with the patient bariatric 3 previously and she is not open to the option patient's history of hypertension blood pressure fair control on current medication tolerated well deny any chest pain short of breath no palpitation a no dyspnea on exertion patient does known to have history of chronic lower extremity edema for what she been taking for some I tolerated well without side effect        The following portions of the patient's history were reviewed and updated as appropriate: allergies, current medications, past family history, past medical history, past social history, past surgical history and problem list     Review of Systems   Constitutional: Negative for fatigue and fever  HENT: Negative for ear pain, sinus pressure, sinus pain and sore throat  Eyes: Negative for pain and redness  Respiratory: Negative for cough, chest tightness and shortness of breath  Cardiovascular: Negative for chest pain, palpitations and leg swelling  Gastrointestinal: Negative for abdominal pain, blood in stool, constipation, diarrhea and nausea  Genitourinary: Negative for flank pain, frequency and hematuria  Musculoskeletal: Negative for back pain and joint swelling  Skin: Negative for rash  Neurological: Negative for dizziness, numbness and headaches     Hematological: Does not bruise/bleed easily  Objective:  Vitals:    01/15/20 1330   BP: 120/80   Pulse: 79   Temp: 99 °F (37 2 °C)   TempSrc: Tympanic   SpO2: 97%   Weight: 109 kg (241 lb)   Height: 5' 2" (1 575 m)      Physical Exam   Constitutional: She is oriented to person, place, and time  She appears well-developed and well-nourished  HENT:   Head: Normocephalic  Right Ear: External ear normal    Left Ear: External ear normal    Eyes: Conjunctivae and EOM are normal  Right eye exhibits no discharge  Left eye exhibits no discharge  Neck: No JVD present  Cardiovascular: Normal rate, regular rhythm and normal heart sounds  Exam reveals no gallop  No murmur heard  Pulmonary/Chest: Effort normal  No respiratory distress  She has no wheezes  She has no rales  She exhibits no tenderness  Abdominal: She exhibits no mass  There is no tenderness  There is no rebound  Musculoskeletal: She exhibits no edema or tenderness  Neurological: She is alert and oriented to person, place, and time  Skin: No rash noted  No erythema  Assessment/Plan:    Gastroesophageal reflux disease   The acute on chronic symptomatic will start the patient on famotidine 20 mg twice a day proper use of medication possible side effect discussed with the patient discussed with her important lose weight avoid provoke food and do not eat and lie down    Essential hypertension   Chronic asymptomatic fair control continue current management encouraged patient to lose weight and low salt diet discussed with the patient    Lower extremity edema   A chronic fair control continue with the furosemide proper use of medication possible side effect discussed the patient encouraged patient about the leg above the level of the heart low-salt diet and important lose weight    Morbid obesity with BMI of 40 0-44 9, adult (HCC)  The BMI is above average  BMI counseling and education was provided to the patient   Nutrition recommendations include reducing portion sizes, decreasing overall calorie intake, 3-5 servings of fruits/vegetables daily, reducing fast food intake, consuming healthier snacks, decreasing soda and/or juice intake, moderation in carbohydrate intake and reducing intake of saturated fat and trans fat  Exercise recommendations include moderate aerobic physical activity for 150 minutes/week, exercising 3-5 times per week and joining a gym  Diagnoses and all orders for this visit:    Gastroesophageal reflux disease without esophagitis  -     famotidine (PEPCID) 20 mg tablet; Take 1 tablet (20 mg total) by mouth 2 (two) times a day  -     CBC and differential; Future    Essential hypertension  -     ROSANA 5-40 MG; Take 1 tablet by mouth daily  -     Comprehensive metabolic panel; Future    Lower extremity edema  -     furosemide (LASIX) 20 mg tablet; Take 1 tablet (20 mg total) by mouth daily  -     CBC and differential; Future    Morbid obesity with BMI of 40 0-44 9, adult (HCC)    Acquired hypothyroidism  -     TSH, 3rd generation with Free T4 reflex; Future    Allergic rhinitis due to animal hair and dander  -     montelukast (SINGULAIR) 10 mg tablet; Take 1 tablet (10 mg total) by mouth every evening    Depressive disorder  -     citalopram (CeleXA) 20 mg tablet; Take 1 tablet (20 mg total) by mouth daily    Vitamin D deficiency  -     Vitamin D 25 hydroxy; Future    Mixed hyperlipidemia  -     Lipid panel; Future          BMI Counseling: Body mass index is 44 08 kg/m²  The BMI is above normal  Nutrition recommendations include reducing portion sizes, decreasing overall calorie intake, 3-5 servings of fruits/vegetables daily and reducing fast food intake  Exercise recommendations include moderate aerobic physical activity for 150 minutes/week

## 2020-01-15 NOTE — PATIENT INSTRUCTIONS

## 2020-01-17 PROBLEM — E66.01 MORBID OBESITY WITH BMI OF 40.0-44.9, ADULT (HCC): Status: ACTIVE | Noted: 2020-01-17

## 2020-01-17 LAB — HBA1C MFR BLD HPLC: 5.9 %

## 2020-01-17 NOTE — ASSESSMENT & PLAN NOTE
The acute on chronic symptomatic will start the patient on famotidine 20 mg twice a day proper use of medication possible side effect discussed with the patient discussed with her important lose weight avoid provoke food and do not eat and lie down

## 2020-01-17 NOTE — ASSESSMENT & PLAN NOTE
A chronic fair control continue with the furosemide proper use of medication possible side effect discussed the patient encouraged patient about the leg above the level of the heart low-salt diet and important lose weight

## 2020-02-17 ENCOUNTER — OFFICE VISIT (OUTPATIENT)
Dept: FAMILY MEDICINE CLINIC | Facility: CLINIC | Age: 60
End: 2020-02-17
Payer: COMMERCIAL

## 2020-02-17 VITALS
HEART RATE: 79 BPM | DIASTOLIC BLOOD PRESSURE: 80 MMHG | RESPIRATION RATE: 16 BRPM | BODY MASS INDEX: 43.98 KG/M2 | HEIGHT: 62 IN | SYSTOLIC BLOOD PRESSURE: 122 MMHG | TEMPERATURE: 99.1 F | WEIGHT: 239 LBS | OXYGEN SATURATION: 94 %

## 2020-02-17 DIAGNOSIS — R73.01 IMPAIRED FASTING GLUCOSE: ICD-10-CM

## 2020-02-17 DIAGNOSIS — I10 ESSENTIAL HYPERTENSION: ICD-10-CM

## 2020-02-17 DIAGNOSIS — F32.A DEPRESSIVE DISORDER: ICD-10-CM

## 2020-02-17 DIAGNOSIS — E78.2 MIXED HYPERLIPIDEMIA: ICD-10-CM

## 2020-02-17 DIAGNOSIS — E55.9 VITAMIN D DEFICIENCY: ICD-10-CM

## 2020-02-17 DIAGNOSIS — E03.9 ACQUIRED HYPOTHYROIDISM: Primary | ICD-10-CM

## 2020-02-17 PROCEDURE — 1036F TOBACCO NON-USER: CPT | Performed by: FAMILY MEDICINE

## 2020-02-17 PROCEDURE — 3074F SYST BP LT 130 MM HG: CPT | Performed by: FAMILY MEDICINE

## 2020-02-17 PROCEDURE — 3008F BODY MASS INDEX DOCD: CPT | Performed by: FAMILY MEDICINE

## 2020-02-17 PROCEDURE — 3079F DIAST BP 80-89 MM HG: CPT | Performed by: FAMILY MEDICINE

## 2020-02-17 PROCEDURE — 99214 OFFICE O/P EST MOD 30 MIN: CPT | Performed by: FAMILY MEDICINE

## 2020-02-17 RX ORDER — ERGOCALCIFEROL 1.25 MG/1
50000 CAPSULE ORAL WEEKLY
Qty: 4 CAPSULE | Refills: 2 | Status: SHIPPED | OUTPATIENT
Start: 2020-02-17 | End: 2020-05-18

## 2020-02-17 NOTE — ASSESSMENT & PLAN NOTE
Chronic uncontrolled will start vitamin-D 14840 International Units once a week for 12 week proper use of medication possible side effect discussed with the patient

## 2020-02-17 NOTE — ASSESSMENT & PLAN NOTE
Chronic asymptomatic fair control continue low carb diet discussed with the patient important lose weight

## 2020-02-17 NOTE — ASSESSMENT & PLAN NOTE
Chronic asymptomatic fair control continue current dose of atorvastatin 20 mg low-fat diet discussed with the patient

## 2020-02-17 NOTE — ASSESSMENT & PLAN NOTE
Chronic asymptomatic fair control patient on citalopram continue current management proper use of medication possible side effect discussed with the patient

## 2020-02-17 NOTE — ASSESSMENT & PLAN NOTE
Chronic asymptomatic fair control continue current dose of levothyroxine proper use discussed with the patient

## 2020-02-17 NOTE — PROGRESS NOTES
Subjective:   Chief Complaint   Patient presents with    Follow-up     chronic conditions        Patient ID: Josefa Garcia is a 63 y o  female  Patient here follow-up with a chronic condition patient history of hyperlipidemia on statin tolerated well without any side effect deny any chest pain short of breath no palpitation no TIA symptom patient history of hypothyroidism on levothyroxine tolerated well deny any heat or cold intolerance mood is stable patient history of impaired fasting glucose try to controlled with the low carb diet deny increased thirsty increased frequency urination no dizziness no headache and no abdomen pain patient was history of the vitamin-D deficiency and and she is positive for fatigue no bone pain no joint pain recent blood work which show her vitamin-D below 20 patient history of depression on citalopram tolerated well mood is stable no suicide attempt or ideation  Recent blood work discussed with the patient      The following portions of the patient's history were reviewed and updated as appropriate: allergies, current medications, past family history, past medical history, past social history, past surgical history and problem list     Review of Systems   Constitutional: Negative for fatigue and fever  HENT: Negative for ear pain, sinus pressure, sinus pain and sore throat  Eyes: Negative for pain and redness  Respiratory: Negative for cough, chest tightness and shortness of breath  Cardiovascular: Negative for chest pain, palpitations and leg swelling  Gastrointestinal: Negative for abdominal pain, blood in stool, constipation, diarrhea and nausea  Genitourinary: Negative for flank pain, frequency and hematuria  Musculoskeletal: Negative for back pain and joint swelling  Skin: Negative for rash  Neurological: Negative for dizziness, numbness and headaches  Hematological: Does not bruise/bleed easily               Objective:  Vitals:    02/17/20 1024 BP: 122/80   BP Location: Left arm   Patient Position: Sitting   Cuff Size: Large   Pulse: 79   Resp: 16   Temp: 99 1 °F (37 3 °C)   TempSrc: Tympanic   SpO2: 94%   Weight: 108 kg (239 lb)   Height: 5' 2" (1 575 m)      Physical Exam   Constitutional: She is oriented to person, place, and time  She appears well-developed and well-nourished  HENT:   Head: Normocephalic  Right Ear: External ear normal    Left Ear: External ear normal    Eyes: Conjunctivae and EOM are normal  Right eye exhibits no discharge  Left eye exhibits no discharge  Neck: No JVD present  Cardiovascular: Normal rate, regular rhythm and normal heart sounds  Exam reveals no gallop  No murmur heard  Pulmonary/Chest: Effort normal  No respiratory distress  She has no wheezes  She has no rales  She exhibits no tenderness  Abdominal: She exhibits no mass  There is no tenderness  There is no rebound  Musculoskeletal: She exhibits no edema or tenderness  Neurological: She is alert and oriented to person, place, and time  Skin: No rash noted  No erythema           Assessment/Plan:    Impaired fasting glucose  Chronic asymptomatic fair control continue low carb diet discussed with the patient important lose weight    Hypothyroidism  Chronic asymptomatic fair control continue current dose of levothyroxine proper use discussed with the patient    Mixed hyperlipidemia  Chronic asymptomatic fair control continue current dose of atorvastatin 20 mg low-fat diet discussed with the patient    Depressive disorder  Chronic asymptomatic fair control patient on citalopram continue current management proper use of medication possible side effect discussed with the patient    Vitamin D deficiency  Chronic uncontrolled will start vitamin-D 21530 International Units once a week for 12 week proper use of medication possible side effect discussed with the patient       Diagnoses and all orders for this visit:    Acquired hypothyroidism  -     CBC and differential; Future  -     Basic metabolic panel; Future  -     Lipid Panel with Direct LDL reflex; Future  -     TSH, 3rd generation with Free T4 reflex; Future  -     Hemoglobin A1C; Future    Impaired fasting glucose  -     CBC and differential; Future  -     Basic metabolic panel; Future  -     Lipid Panel with Direct LDL reflex; Future  -     TSH, 3rd generation with Free T4 reflex; Future  -     Hemoglobin A1C; Future    Vitamin D deficiency  -     ergocalciferol (VITAMIN D2) 50,000 units; Take 1 capsule (50,000 Units total) by mouth once a week  -     CBC and differential; Future  -     Basic metabolic panel; Future  -     Lipid Panel with Direct LDL reflex; Future  -     TSH, 3rd generation with Free T4 reflex; Future  -     Hemoglobin A1C; Future    Essential hypertension  -     CBC and differential; Future  -     Basic metabolic panel; Future  -     Lipid Panel with Direct LDL reflex; Future  -     TSH, 3rd generation with Free T4 reflex;  Future  -     Hemoglobin A1C; Future    Mixed hyperlipidemia    Depressive disorder

## 2020-04-06 DIAGNOSIS — F32.A DEPRESSIVE DISORDER: ICD-10-CM

## 2020-04-06 RX ORDER — CITALOPRAM 20 MG/1
TABLET ORAL
Qty: 90 TABLET | Refills: 0 | Status: SHIPPED | OUTPATIENT
Start: 2020-04-06 | End: 2020-04-22 | Stop reason: SDUPTHER

## 2020-04-22 DIAGNOSIS — E03.9 ACQUIRED HYPOTHYROIDISM: ICD-10-CM

## 2020-04-22 DIAGNOSIS — K21.9 GASTROESOPHAGEAL REFLUX DISEASE WITHOUT ESOPHAGITIS: ICD-10-CM

## 2020-04-22 DIAGNOSIS — E78.2 MIXED HYPERLIPIDEMIA: ICD-10-CM

## 2020-04-22 DIAGNOSIS — F32.A DEPRESSIVE DISORDER: ICD-10-CM

## 2020-04-22 DIAGNOSIS — M47.27 OSTEOARTHRITIS OF SPINE WITH RADICULOPATHY, LUMBOSACRAL REGION: ICD-10-CM

## 2020-04-22 DIAGNOSIS — J30.81 ALLERGIC RHINITIS DUE TO ANIMAL HAIR AND DANDER: ICD-10-CM

## 2020-04-22 DIAGNOSIS — I10 ESSENTIAL HYPERTENSION: ICD-10-CM

## 2020-04-22 DIAGNOSIS — R60.0 LOWER EXTREMITY EDEMA: ICD-10-CM

## 2020-04-22 RX ORDER — MONTELUKAST SODIUM 10 MG/1
10 TABLET ORAL EVERY EVENING
Qty: 90 TABLET | Refills: 0 | Status: SHIPPED | OUTPATIENT
Start: 2020-04-22 | End: 2020-08-07

## 2020-04-22 RX ORDER — LEVOTHYROXINE SODIUM 112 UG/1
112 TABLET ORAL DAILY
Qty: 90 TABLET | Refills: 0 | Status: SHIPPED | OUTPATIENT
Start: 2020-04-22 | End: 2020-08-07

## 2020-04-22 RX ORDER — FAMOTIDINE 20 MG/1
20 TABLET, FILM COATED ORAL 2 TIMES DAILY
Qty: 60 TABLET | Refills: 0 | Status: SHIPPED | OUTPATIENT
Start: 2020-04-22 | End: 2020-08-28 | Stop reason: ALTCHOICE

## 2020-04-22 RX ORDER — FUROSEMIDE 20 MG/1
20 TABLET ORAL DAILY
Qty: 90 TABLET | Refills: 0 | Status: SHIPPED | OUTPATIENT
Start: 2020-04-22 | End: 2020-07-24

## 2020-04-22 RX ORDER — IBUPROFEN 600 MG/1
600 TABLET ORAL EVERY 6 HOURS PRN
Qty: 30 TABLET | Refills: 0 | Status: SHIPPED | OUTPATIENT
Start: 2020-04-22 | End: 2020-08-28 | Stop reason: SDUPTHER

## 2020-04-22 RX ORDER — CITALOPRAM 20 MG/1
20 TABLET ORAL DAILY
Qty: 90 TABLET | Refills: 0 | Status: SHIPPED | OUTPATIENT
Start: 2020-04-22 | End: 2020-08-28 | Stop reason: SDUPTHER

## 2020-04-22 RX ORDER — ATORVASTATIN CALCIUM 20 MG/1
20 TABLET, FILM COATED ORAL DAILY
Qty: 90 TABLET | Refills: 0 | Status: SHIPPED | OUTPATIENT
Start: 2020-04-22 | End: 2020-07-27

## 2020-04-22 RX ORDER — AMLODIPINE BESYLATE AND OLMESARTAN MEDOXOMIL 5; 40 MG/1; MG/1
1 TABLET, FILM COATED ORAL DAILY
Qty: 90 TABLET | Refills: 0 | Status: SHIPPED | OUTPATIENT
Start: 2020-04-22 | End: 2020-08-07

## 2020-05-17 DIAGNOSIS — E55.9 VITAMIN D DEFICIENCY: ICD-10-CM

## 2020-05-18 RX ORDER — ERGOCALCIFEROL 1.25 MG/1
CAPSULE ORAL
Qty: 4 CAPSULE | Refills: 0 | Status: SHIPPED | OUTPATIENT
Start: 2020-05-18 | End: 2020-08-28 | Stop reason: ALTCHOICE

## 2020-07-24 DIAGNOSIS — R60.0 LOWER EXTREMITY EDEMA: ICD-10-CM

## 2020-07-24 RX ORDER — FUROSEMIDE 20 MG/1
TABLET ORAL
Qty: 90 TABLET | Refills: 0 | Status: SHIPPED | OUTPATIENT
Start: 2020-07-24 | End: 2020-08-28 | Stop reason: SDUPTHER

## 2020-07-27 DIAGNOSIS — E78.2 MIXED HYPERLIPIDEMIA: ICD-10-CM

## 2020-07-27 RX ORDER — ATORVASTATIN CALCIUM 20 MG/1
TABLET, FILM COATED ORAL
Qty: 90 TABLET | Refills: 0 | Status: SHIPPED | OUTPATIENT
Start: 2020-07-27 | End: 2020-08-28 | Stop reason: SDUPTHER

## 2020-08-06 DIAGNOSIS — J30.81 ALLERGIC RHINITIS DUE TO ANIMAL HAIR AND DANDER: ICD-10-CM

## 2020-08-06 DIAGNOSIS — E03.9 ACQUIRED HYPOTHYROIDISM: ICD-10-CM

## 2020-08-06 DIAGNOSIS — I10 ESSENTIAL HYPERTENSION: ICD-10-CM

## 2020-08-07 DIAGNOSIS — I10 ESSENTIAL HYPERTENSION: ICD-10-CM

## 2020-08-07 RX ORDER — AMLODIPINE BESYLATE AND OLMESARTAN MEDOXOMIL 5; 40 MG/1; MG/1
TABLET, FILM COATED ORAL
Qty: 90 TABLET | Refills: 0 | Status: SHIPPED | OUTPATIENT
Start: 2020-08-07 | End: 2020-08-10 | Stop reason: CLARIF

## 2020-08-07 RX ORDER — MONTELUKAST SODIUM 5 MG/1
TABLET, CHEWABLE ORAL
Qty: 180 TABLET | Refills: 0 | Status: SHIPPED | OUTPATIENT
Start: 2020-08-07 | End: 2020-08-28 | Stop reason: DRUGHIGH

## 2020-08-07 RX ORDER — LEVOTHYROXINE SODIUM 112 UG/1
TABLET ORAL
Qty: 90 TABLET | Refills: 0 | Status: SHIPPED | OUTPATIENT
Start: 2020-08-07 | End: 2020-08-28 | Stop reason: SDUPTHER

## 2020-08-10 DIAGNOSIS — I10 ESSENTIAL HYPERTENSION: Primary | ICD-10-CM

## 2020-08-10 RX ORDER — AMLODIPINE BESYLATE 5 MG/1
5 TABLET ORAL DAILY
Qty: 30 TABLET | Refills: 0 | Status: SHIPPED | OUTPATIENT
Start: 2020-08-10 | End: 2020-08-28 | Stop reason: ALTCHOICE

## 2020-08-10 RX ORDER — AMLODIPINE AND OLMESARTAN MEDOXOMIL 5; 40 MG/1; MG/1
TABLET ORAL
Qty: 90 TABLET | Refills: 0 | OUTPATIENT
Start: 2020-08-10

## 2020-08-10 RX ORDER — TELMISARTAN 80 MG/1
80 TABLET ORAL DAILY
Qty: 30 TABLET | Refills: 0 | Status: SHIPPED | OUTPATIENT
Start: 2020-08-10 | End: 2020-08-28 | Stop reason: ALTCHOICE

## 2020-08-10 NOTE — TELEPHONE ENCOUNTER
Du 5-40 Med is not cover any more will change it to  Amlodipine 5 mg po/day #30  Micardis 80 mg po/day #30  To notify patient

## 2020-08-10 NOTE — TELEPHONE ENCOUNTER
Per dr Angella Mobley stopped brian and added amlodipine and micardis to med list  Sent to dr Angella Mobley for sig and left message for patient

## 2020-08-10 NOTE — TELEPHONE ENCOUNTER
Per dr Clifford Wallace brian nor covered  Sent amlodipine and micardis to pharmcy per dr Clifford Wallace and patient notified   brian took off med list

## 2020-08-28 ENCOUNTER — OFFICE VISIT (OUTPATIENT)
Dept: FAMILY MEDICINE CLINIC | Facility: CLINIC | Age: 60
End: 2020-08-28
Payer: COMMERCIAL

## 2020-08-28 VITALS
HEART RATE: 85 BPM | SYSTOLIC BLOOD PRESSURE: 130 MMHG | WEIGHT: 239 LBS | HEIGHT: 62 IN | OXYGEN SATURATION: 96 % | BODY MASS INDEX: 43.98 KG/M2 | DIASTOLIC BLOOD PRESSURE: 80 MMHG | TEMPERATURE: 98 F

## 2020-08-28 DIAGNOSIS — E66.01 MORBID OBESITY WITH BMI OF 40.0-44.9, ADULT (HCC): ICD-10-CM

## 2020-08-28 DIAGNOSIS — E78.2 MIXED HYPERLIPIDEMIA: ICD-10-CM

## 2020-08-28 DIAGNOSIS — Z12.31 SCREENING MAMMOGRAM, ENCOUNTER FOR: ICD-10-CM

## 2020-08-28 DIAGNOSIS — I10 ESSENTIAL HYPERTENSION: ICD-10-CM

## 2020-08-28 DIAGNOSIS — J30.81 ALLERGIC RHINITIS DUE TO ANIMAL HAIR AND DANDER: ICD-10-CM

## 2020-08-28 DIAGNOSIS — F32.A DEPRESSIVE DISORDER: ICD-10-CM

## 2020-08-28 DIAGNOSIS — Z00.01 ENCOUNTER FOR WELL ADULT EXAM WITH ABNORMAL FINDINGS: Primary | ICD-10-CM

## 2020-08-28 DIAGNOSIS — I10 ESSENTIAL HYPERTENSION: Primary | ICD-10-CM

## 2020-08-28 DIAGNOSIS — K64.8 OTHER HEMORRHOIDS: ICD-10-CM

## 2020-08-28 DIAGNOSIS — M47.27 OSTEOARTHRITIS OF SPINE WITH RADICULOPATHY, LUMBOSACRAL REGION: ICD-10-CM

## 2020-08-28 DIAGNOSIS — E55.9 VITAMIN D DEFICIENCY: ICD-10-CM

## 2020-08-28 DIAGNOSIS — J30.89 SEASONAL ALLERGIC RHINITIS DUE TO OTHER ALLERGIC TRIGGER: ICD-10-CM

## 2020-08-28 DIAGNOSIS — E03.9 ACQUIRED HYPOTHYROIDISM: ICD-10-CM

## 2020-08-28 DIAGNOSIS — R73.01 IMPAIRED FASTING GLUCOSE: ICD-10-CM

## 2020-08-28 DIAGNOSIS — R60.0 LOWER EXTREMITY EDEMA: ICD-10-CM

## 2020-08-28 DIAGNOSIS — Z23 NEED FOR INFLUENZA VACCINATION: ICD-10-CM

## 2020-08-28 PROCEDURE — 99396 PREV VISIT EST AGE 40-64: CPT | Performed by: FAMILY MEDICINE

## 2020-08-28 PROCEDURE — 3079F DIAST BP 80-89 MM HG: CPT | Performed by: FAMILY MEDICINE

## 2020-08-28 PROCEDURE — 3075F SYST BP GE 130 - 139MM HG: CPT | Performed by: FAMILY MEDICINE

## 2020-08-28 PROCEDURE — 90686 IIV4 VACC NO PRSV 0.5 ML IM: CPT

## 2020-08-28 PROCEDURE — 90471 IMMUNIZATION ADMIN: CPT

## 2020-08-28 PROCEDURE — 1036F TOBACCO NON-USER: CPT | Performed by: FAMILY MEDICINE

## 2020-08-28 PROCEDURE — 99214 OFFICE O/P EST MOD 30 MIN: CPT | Performed by: FAMILY MEDICINE

## 2020-08-28 RX ORDER — LEVOTHYROXINE SODIUM 112 UG/1
112 TABLET ORAL DAILY
Qty: 90 TABLET | Refills: 0 | Status: SHIPPED | OUTPATIENT
Start: 2020-08-28 | End: 2021-01-13 | Stop reason: SDUPTHER

## 2020-08-28 RX ORDER — MONTELUKAST SODIUM 5 MG/1
10 TABLET, CHEWABLE ORAL EVERY EVENING
Qty: 180 TABLET | Refills: 0 | Status: CANCELLED | OUTPATIENT
Start: 2020-08-28

## 2020-08-28 RX ORDER — CITALOPRAM 20 MG/1
20 TABLET ORAL DAILY
Qty: 90 TABLET | Refills: 0 | Status: SHIPPED | OUTPATIENT
Start: 2020-08-28 | End: 2021-01-03

## 2020-08-28 RX ORDER — MONTELUKAST SODIUM 10 MG/1
10 TABLET ORAL
Qty: 90 TABLET | Refills: 0 | Status: SHIPPED | OUTPATIENT
Start: 2020-08-28 | End: 2020-11-30

## 2020-08-28 RX ORDER — IBUPROFEN 600 MG/1
600 TABLET ORAL EVERY 6 HOURS PRN
Qty: 30 TABLET | Refills: 0 | Status: SHIPPED | OUTPATIENT
Start: 2020-08-28

## 2020-08-28 RX ORDER — HYDROCORTISONE 25 MG/G
CREAM TOPICAL 2 TIMES DAILY
Qty: 28 G | Refills: 1 | Status: SHIPPED | OUTPATIENT
Start: 2020-08-28

## 2020-08-28 RX ORDER — ATORVASTATIN CALCIUM 20 MG/1
20 TABLET, FILM COATED ORAL DAILY
Qty: 90 TABLET | Refills: 0 | Status: SHIPPED | OUTPATIENT
Start: 2020-08-28 | End: 2021-01-13 | Stop reason: SDUPTHER

## 2020-08-28 RX ORDER — AMLODIPINE AND OLMESARTAN MEDOXOMIL 5; 40 MG/1; MG/1
1 TABLET ORAL DAILY
Qty: 30 TABLET | Refills: 2 | Status: SHIPPED | OUTPATIENT
Start: 2020-08-28 | End: 2020-08-31

## 2020-08-28 RX ORDER — AMLODIPINE AND OLMESARTAN MEDOXOMIL 5; 40 MG/1; MG/1
1 TABLET ORAL DAILY
COMMUNITY
End: 2020-08-28 | Stop reason: CLARIF

## 2020-08-28 RX ORDER — FUROSEMIDE 20 MG/1
20 TABLET ORAL DAILY
Qty: 90 TABLET | Refills: 0 | Status: SHIPPED | OUTPATIENT
Start: 2020-08-28 | End: 2021-01-13 | Stop reason: SDUPTHER

## 2020-08-28 NOTE — PROGRESS NOTES
BMI Counseling: Body mass index is 43 71 kg/m²  The BMI is above normal  Nutrition recommendations include decreasing portion sizes, consuming healthier snacks and limiting drinks that contain sugar  Exercise recommendations include exercising 3-5 times per week  FAMILY PRACTICE HEALTH MAINTENANCE OFFICE VISIT  [facility]    NAME: Marco Rodney  AGE: 63 y o  SEX: female  : 1960     DATE: 2020    Assessment and Plan     1  Encounter for well adult exam with abnormal findings  Assessment & Plan:  Advice and education were given regarding nutrition, aerobic exercises, weight bearing exercises, cardiovascular risk reduction, fall risk reduction, and age appropriate supplements  The patient was counseled regarding instructions for management, risk factor reductions, prognosis, risks and benefits of treatment options, patient and family education, and importance of compliance with treatment  Discussed flu shot with the patient and show a tolerated well without any side effect      2  Morbid obesity with BMI of 40 0-44 9, adult [facility])  Assessment & Plan:  Chronic uncontrolled patient not interested in the bariatric option we discussed the patient portion control low carb low-fat diet and increased physical activity    Orders:  -     CBC and differential; Future  -     Comprehensive metabolic panel; Future  -     Lipid Panel with Direct LDL reflex; Future  -     TSH, 3rd generation with Free T4 reflex; Future  -     Vitamin D 25 hydroxy; Future    3   Other hemorrhoids  Assessment & Plan:  A new diagnosis symptomatic will recommend the hydrocortisone 2 5% cream to apply it twice a day for 7-10 days and discussed the patient important regulate bowel movement avoid constipation explain the not sure of the problem with the patient important lose weight    Orders:  -     hydrocortisone (ANUSOL-HC) 2 5 % rectal cream; Apply topically 2 (two) times a day    4  Depressive disorder  Assessment & Plan:  A chronic asymptomatic fair control continue with the citalopram 20 mg once a day proper use discussed with the patient    Orders:  -     citalopram (CeleXA) 20 mg tablet; Take 1 tablet (20 mg total) by mouth daily    5  Osteoarthritis of spine with radiculopathy, lumbosacral region  Assessment & Plan:  Chronic symptomatic today with the leg pain a discussed with the patient proper postural important lose weight recommend physical therapy she decline discussed the ibuprofen for the pain recommend to take it just for 7-10 days secondary patient history of hypertension    Orders:  -     ibuprofen (MOTRIN) 600 mg tablet; Take 1 tablet (600 mg total) by mouth every 6 (six) hours as needed for mild pain    6  Lower extremity edema  Assessment & Plan:  A chronic stable patient already on Lasix 20 mg once a day discussed low-salt diet and important lose weight    Orders:  -     furosemide (LASIX) 20 mg tablet; Take 1 tablet (20 mg total) by mouth daily    7  Mixed hyperlipidemia  -     atorvastatin (LIPITOR) 20 mg tablet; Take 1 tablet (20 mg total) by mouth daily  -     CBC and differential; Future  -     Comprehensive metabolic panel; Future  -     Lipid Panel with Direct LDL reflex; Future  -     TSH, 3rd generation with Free T4 reflex; Future  -     Vitamin D 25 hydroxy; Future    8  Acquired hypothyroidism  -     levothyroxine 112 mcg tablet; Take 1 tablet (112 mcg total) by mouth daily  -     TSH, 3rd generation with Free T4 reflex; Future    9  Allergic rhinitis due to animal hair and dander    10  Seasonal allergic rhinitis due to other allergic trigger  -     montelukast (SINGULAIR) 10 mg tablet; Take 1 tablet (10 mg total) by mouth daily at bedtime    11  Impaired fasting glucose  -     CBC and differential; Future  -     Comprehensive metabolic panel; Future  -     Lipid Panel with Direct LDL reflex;  Future  -     TSH, 3rd generation with Free T4 reflex; Future  -     Vitamin D 25 hydroxy; Future    12  Essential hypertension  Assessment & Plan:  Chronic asymptomatic fair control continue current management discussed the patient important lose weight low-salt diet and increase physical activity    Orders:  -     CBC and differential; Future  -     Comprehensive metabolic panel; Future  -     Lipid Panel with Direct LDL reflex; Future  -     TSH, 3rd generation with Free T4 reflex; Future  -     Vitamin D 25 hydroxy; Future    13  Vitamin D deficiency  -     Vitamin D 25 hydroxy; Future    14  Need for influenza vaccination  -     FLUZONE: influenza vaccine, quadrivalent, 0 5 mL    15  Screening mammogram, encounter for  -     Mammo screening bilateral w 3d & cad; Future; Expected date: 09/13/2020      · Patient Counseling:   · Nutrition: Stressed importance of a well balanced diet, moderation of sodium/saturated fat, caloric balance and sufficient intake of fiber  · Exercise: Stressed the importance of regular exercise with a goal of 150 minutes per week  · Dental Health: Discussed daily flossing and brushing and regular dental visits     · Immunizations reviewed: Risks and Benefits discussed  · Discussed benefits of:  Colon Cancer Screening, Mammogram  and Cervical Cancer screening   BMI Counseling: Body mass index is 43 71 kg/m²  Discussed with patient's BMI with her         Chief Complaint     Chief Complaint   Patient presents with    Physical Exam    Leg Pain       History of Present Illness     Patient here for annual physical exam deny any chest pain short of breath no palpitation no cough no wheezing no hematosis deny any headache blurred vision no weakness or lateralized the symptom no abdomen pain nausea vomiting or diarrhea no renal problem high side the and change in the weight and no change in the mood deny smoking does not do any special diet does not do exercise regularly    [facility] Adult Physical   Patient here for a comprehensive physical exam       Diet and Physical Activity  Diet: Regular diet  Exercise: frequently      Depression Screen  PHQ-9 Depression Screening    PHQ-9:    Frequency of the following problems over the past two weeks:               General Health  Hearing: Normal:  bilateral  Vision: wears glasses  Dental: regular dental visits    Reproductive Health  Follows with gynecologist      The following portions of the patient's history were reviewed and updated as appropriate: allergies, current medications, past family history, past medical history, past social history, past surgical history and problem list     Review of Systems     Review of Systems   Constitutional: Negative for activity change, appetite change, fatigue and fever  HENT: Negative for congestion, ear pain, sinus pressure, sinus pain and sore throat  Eyes: Negative for pain, discharge, redness and itching  Respiratory: Negative for cough, chest tightness, shortness of breath and stridor  Cardiovascular: Negative for chest pain, palpitations and leg swelling  Gastrointestinal: Negative for abdominal pain, blood in stool, constipation, diarrhea and nausea  Endocrine: Negative for heat intolerance and polydipsia  Genitourinary: Negative for dysuria, flank pain, frequency and hematuria  Musculoskeletal: Negative for back pain, joint swelling and neck pain  B/L leg pain   Skin: Negative for pallor and rash  Neurological: Negative for dizziness, tremors, weakness, numbness and headaches  Hematological: Does not bruise/bleed easily  Psychiatric/Behavioral: Negative for agitation and behavioral problems         Past Medical History     Past Medical History:   Diagnosis Date    Allergic rhinitis     Cardiac murmur     Depression     GERD (gastroesophageal reflux disease)     Hyperlipidemia     Hypertension     Hypothyroidism     IFG (impaired fasting glucose)     Obesity     Osteoarthritis of spine with radiculopathy, lumbosacral region 2019    Vitamin D deficiency        Past Surgical History     Past Surgical History:   Procedure Laterality Date     SECTION      3       Social History     Social History     Socioeconomic History    Marital status: /Civil Union     Spouse name: None    Number of children: None    Years of education: None    Highest education level: None   Occupational History    None   Social Needs    Financial resource strain: Not hard at all   [employer] insecurity     Worry: Never true     Inability: Never true    Transportation needs     Medical: No     Non-medical: No   Tobacco Use    Smoking status: Never Smoker    Smokeless tobacco: Never Used    Tobacco comment: no passive smoke exposure   Substance and Sexual Activity    Alcohol use: No     Frequency: Never     Binge frequency: Never    Drug use: No    Sexual activity: Yes     Partners: Male   Lifestyle    Physical activity     Days per week: 0 days     Minutes per session: 0 min    Stress:  To some extent   Relationships    Social connections     Talks on phone: More than three times a week     Gets together: More than three times a week     Attends [employer] service: More than 4 times per year     Active member of club or organization: No     Attends meetings of clubs or organizations: Never     Relationship status:     Intimate partner violence     Fear of current or ex partner: No     Emotionally abused: No     Physically abused: No     Forced sexual activity: No   Other Topics Concern    None   Social History Narrative    None       Family History     Family History   Problem Relation Age of Onset    Heart disease Father     Breast cancer Sister     Ovarian cancer Mother 80    No Known Problems Maternal Grandmother     No Known Problems Paternal Grandmother     No Known Problems Sister     No Known Problems Sister     No Known Problems Sister     No Known Problems Daughter     No Known Problems Maternal Aunt     No Known Problems Maternal Aunt     No Known Problems Paternal Aunt     No Known Problems Paternal Aunt     No Known Problems Paternal Aunt        Current Medications       Current Outpatient Medications:     atorvastatin (LIPITOR) 20 mg tablet, Take 1 tablet (20 mg total) by mouth daily, Disp: 90 tablet, Rfl: 0    citalopram (CeleXA) 20 mg tablet, Take 1 tablet (20 mg total) by mouth daily, Disp: 90 tablet, Rfl: 0    diazepam (VALIUM) 10 mg tablet, Valium 10 mg tablet  Take 2 tablets as needed by oral route , Disp: , Rfl:     diclofenac sodium (Voltaren) 1 %, Voltaren 1 % topical gel  APPLY 2 GRAMS TO THE AFFECTED AREA(S) BY TOPICAL ROUTE 4 TIMES PER DAY PRN, Disp: , Rfl:     fexofenadine (ALLEGRA ALLERGY) 180 MG tablet, take 1 tablet (180MG)  by oral route  every day, Disp: , Rfl:     fluticasone (FLONASE) 50 mcg/act nasal spray, as needed, Disp: , Rfl:     furosemide (LASIX) 20 mg tablet, Take 1 tablet (20 mg total) by mouth daily, Disp: 90 tablet, Rfl: 0    ibuprofen (MOTRIN) 600 mg tablet, Take 1 tablet (600 mg total) by mouth every 6 (six) hours as needed for mild pain, Disp: 30 tablet, Rfl: 0    levothyroxine 112 mcg tablet, Take 1 tablet (112 mcg total) by mouth daily, Disp: 90 tablet, Rfl: 0    amlodipine-olmesartan (Du) 5-40 MG, Take 1 tablet by mouth daily, Disp: 30 tablet, Rfl: 2    hydrocortisone (ANUSOL-HC) 2 5 % rectal cream, Apply topically 2 (two) times a day, Disp: 28 g, Rfl: 1    montelukast (SINGULAIR) 10 mg tablet, Take 1 tablet (10 mg total) by mouth daily at bedtime, Disp: 90 tablet, Rfl: 0     Allergies     Allergies   Allergen Reactions    No Active Allergies        Objective     /80   Pulse 85   Temp 98 °F (36 7 °C) (Tympanic)   Ht 5' 2" (1 575 m)   Wt 108 kg (239 lb)   SpO2 96%   Breastfeeding No   BMI 43 71 kg/m²      Physical Exam  Vitals signs and nursing note reviewed  Constitutional:       General: She is not in acute distress       Appearance: She is well-developed  She is not diaphoretic  HENT:      Head: Normocephalic  Right Ear: Tympanic membrane, ear canal and external ear normal       Left Ear: Tympanic membrane, ear canal and external ear normal       Nose: Nose normal  No congestion or rhinorrhea  Mouth/Throat:      Mouth: Mucous membranes are moist       Pharynx: Oropharynx is clear  No oropharyngeal exudate or posterior oropharyngeal erythema  Eyes:      General:         Right eye: No discharge  Left eye: No discharge  Conjunctiva/sclera: Conjunctivae normal       Pupils: Pupils are equal, round, and reactive to light  Neck:      Musculoskeletal: Normal range of motion and neck supple  Vascular: No JVD  Cardiovascular:      Rate and Rhythm: Normal rate and regular rhythm  Heart sounds: Normal heart sounds  No murmur  No gallop  Pulmonary:      Effort: Pulmonary effort is normal  No respiratory distress  Breath sounds: Normal breath sounds  No stridor  No wheezing or rales  Chest:      Chest wall: No tenderness  Abdominal:      General: There is no distension  Palpations: Abdomen is soft  There is no mass  Tenderness: There is no abdominal tenderness  There is no rebound  Musculoskeletal:      Lumbar back: She exhibits tenderness  She exhibits normal range of motion, no edema, no deformity and no spasm  Lymphadenopathy:      Cervical: No cervical adenopathy  Skin:     General: Skin is warm  Findings: No erythema or rash  Neurological:      Mental Status: She is alert and oriented to person, place, and time  Motor: No weakness        Gait: Gait normal    Psychiatric:         Mood and Affect: Mood normal          Behavior: Behavior normal            No exam data present        Kori Grover MD  [address] [place] [address]

## 2020-08-28 NOTE — PROGRESS NOTES
Subjective:   Chief Complaint   Patient presents with    Physical Exam    Leg Pain        Patient ID: Shady Sanchez is a 63 y o  female  The patient here for annual physical exam and had multiple concern she is concerned about the leg pain bilateral a mostly on the left side on the anterior thigh down below-the-knee and this has been going on for more than 2-3 months and it is on and off describe it as achy worse when she stand on her feet for a long time no recent fall no recent trauma no muscle weakness no rash in no upper story infection patient does have history of osteoarthritis of the lumbar spine and had history of morbid obesity patient take sometimes ibuprofen and does help  The patient also concerned about the painful with defecation and the bulging in hemorrhoid she did have history of hemorrhoid long time ago a did come down but now it coming back and she deny any change in her diet no constipation no abdomen distension sometimes she get the blood when she wiped and but no thrombosis no personal family history of colon cancer  Patient history of hypertension blood pressure fair control on current medication tolerated well without any side effect deny any chest pain short of breath no palpitation no dyspnea on exertion patient does have a chronic lower extremity edema for what she been using Lasix and it helping in deny short of breast the and the lower extremity edema is stable patient history of depression disorder she been taking citalopram 20 mg mood stable no sleeping problem no suicide attempt or ideation and no hospitalization      The following portions of the patient's history were reviewed and updated as appropriate: allergies, current medications, past family history, past medical history, past social history, past surgical history and problem list     Review of Systems   Constitutional: Negative for activity change, appetite change, fatigue and fever     HENT: Negative for congestion, ear pain, sinus pressure, sinus pain and sore throat  Eyes: Negative for pain, discharge, redness and itching  Respiratory: Negative for cough, chest tightness, shortness of breath and stridor  Cardiovascular: Negative for chest pain, palpitations and leg swelling  Gastrointestinal: Negative for abdominal pain, blood in stool, constipation, diarrhea and nausea  Genitourinary: Negative for dysuria, flank pain, frequency and hematuria  Musculoskeletal: Negative for back pain, joint swelling and neck pain  B/L leg pain   Skin: Negative for pallor and rash  Neurological: Negative for dizziness, tremors, weakness, numbness and headaches  Hematological: Does not bruise/bleed easily  Objective:  Vitals:    08/28/20 1100   BP: 130/80   Pulse: 85   Temp: 98 °F (36 7 °C)   TempSrc: Tympanic   SpO2: 96%   Weight: 108 kg (239 lb)   Height: 5' 2" (1 575 m)      Physical Exam  Vitals signs and nursing note reviewed  Constitutional:       General: She is not in acute distress  Appearance: She is well-developed  She is not diaphoretic  HENT:      Head: Normocephalic  Right Ear: Tympanic membrane, ear canal and external ear normal       Left Ear: Tympanic membrane, ear canal and external ear normal       Nose: Nose normal  No congestion or rhinorrhea  Mouth/Throat:      Mouth: Mucous membranes are moist       Pharynx: Oropharynx is clear  No oropharyngeal exudate or posterior oropharyngeal erythema  Eyes:      General:         Right eye: No discharge  Left eye: No discharge  Conjunctiva/sclera: Conjunctivae normal       Pupils: Pupils are equal, round, and reactive to light  Neck:      Musculoskeletal: Normal range of motion and neck supple  Vascular: No JVD  Cardiovascular:      Rate and Rhythm: Normal rate and regular rhythm  Heart sounds: Normal heart sounds  No murmur  No gallop      Pulmonary:      Effort: Pulmonary effort is normal  No respiratory distress  Breath sounds: Normal breath sounds  No stridor  No wheezing or rales  Chest:      Chest wall: No tenderness  Abdominal:      General: There is no distension  Palpations: Abdomen is soft  There is no mass  Tenderness: There is no abdominal tenderness  There is no rebound  Musculoskeletal:      Lumbar back: She exhibits tenderness  She exhibits normal range of motion, no swelling, no deformity and no spasm  Lymphadenopathy:      Cervical: No cervical adenopathy  Skin:     General: Skin is warm  Findings: No erythema or rash  Neurological:      Mental Status: She is alert and oriented to person, place, and time  Motor: No weakness  Gait: Gait normal            Assessment/Plan:    Encounter for well adult exam with abnormal findings  Advice and education were given regarding nutrition, aerobic exercises, weight bearing exercises, cardiovascular risk reduction, fall risk reduction, and age appropriate supplements  The patient was counseled regarding instructions for management, risk factor reductions, prognosis, risks and benefits of treatment options, patient and family education, and importance of compliance with treatment       Discussed flu shot with the patient and show a tolerated well without any side effect    Morbid obesity with BMI of 40 0-44 9, adult ([facility] [address] )  Chronic uncontrolled patient not interested in the bariatric option we discussed the patient portion control low carb low-fat diet and increased physical activity    Other hemorrhoids  A new diagnosis symptomatic will recommend the hydrocortisone 2 5% cream to apply it twice a day for 7-10 days and discussed the patient important regulate bowel movement avoid constipation explain the not sure of the problem with the patient important lose weight    Essential hypertension  Chronic asymptomatic fair control continue current management discussed the patient important lose weight low-salt diet and increase physical activity    Depressive disorder  A chronic asymptomatic fair control continue with the citalopram 20 mg once a day proper use discussed with the patient    Lower extremity edema  A chronic stable patient already on Lasix 20 mg once a day discussed low-salt diet and important lose weight    Osteoarthritis of spine with radiculopathy, lumbosacral region  Chronic symptomatic today with the leg pain a discussed with the patient proper postural important lose weight recommend physical therapy she decline discussed the ibuprofen for the pain recommend to take it just for 7-10 days secondary patient history of hypertension       Diagnoses and all orders for this visit:    Encounter for well adult exam with abnormal findings    Morbid obesity with BMI of 40 0-44 9, adult ([facility] [address] )  -     CBC and differential; Future  -     Comprehensive metabolic panel; Future  -     Lipid Panel with Direct LDL reflex; Future  -     TSH, 3rd generation with Free T4 reflex; Future  -     Vitamin D 25 hydroxy; Future    Other hemorrhoids  -     hydrocortisone (ANUSOL-HC) 2 5 % rectal cream; Apply topically 2 (two) times a day    Depressive disorder  -     citalopram (CeleXA) 20 mg tablet; Take 1 tablet (20 mg total) by mouth daily    Osteoarthritis of spine with radiculopathy, lumbosacral region  -     ibuprofen (MOTRIN) 600 mg tablet; Take 1 tablet (600 mg total) by mouth every 6 (six) hours as needed for mild pain    Lower extremity edema  -     furosemide (LASIX) 20 mg tablet; Take 1 tablet (20 mg total) by mouth daily    Mixed hyperlipidemia  -     atorvastatin (LIPITOR) 20 mg tablet; Take 1 tablet (20 mg total) by mouth daily  -     CBC and differential; Future  -     Comprehensive metabolic panel; Future  -     Lipid Panel with Direct LDL reflex; Future  -     TSH, 3rd generation with Free T4 reflex; Future  -     Vitamin D 25 hydroxy; Future    Acquired hypothyroidism  -     levothyroxine 112 mcg tablet;  Take 1 tablet (112 mcg total) by mouth daily  -     TSH, 3rd generation with Free T4 reflex; Future    Allergic rhinitis due to animal hair and dander    Seasonal allergic rhinitis due to other allergic trigger  -     montelukast (SINGULAIR) 10 mg tablet; Take 1 tablet (10 mg total) by mouth daily at bedtime    Impaired fasting glucose  -     CBC and differential; Future  -     Comprehensive metabolic panel; Future  -     Lipid Panel with Direct LDL reflex; Future  -     TSH, 3rd generation with Free T4 reflex; Future  -     Vitamin D 25 hydroxy; Future    Essential hypertension  -     CBC and differential; Future  -     Comprehensive metabolic panel; Future  -     Lipid Panel with Direct LDL reflex; Future  -     TSH, 3rd generation with Free T4 reflex; Future  -     Vitamin D 25 hydroxy; Future    Vitamin D deficiency  -     Vitamin D 25 hydroxy; Future    Need for influenza vaccination  -     FLUZONE: influenza vaccine, quadrivalent, 0 5 mL    Screening mammogram, encounter for  -     Mammo screening bilateral w 3d & cad; Future    Other orders  -     diclofenac sodium (Voltaren) 1 %; Voltaren 1 % topical gel   APPLY 2 GRAMS TO THE AFFECTED AREA(S) BY TOPICAL ROUTE 4 TIMES PER DAY PRN  -     Discontinue: amlodipine-olmesartan (Du) 5-40 MG; Take 1 tablet by mouth daily  -     Cancel: montelukast (SINGULAIR) 5 mg chewable tablet;  Chew 2 tablets (10 mg total) every evening

## 2020-08-30 PROBLEM — K64.8 OTHER HEMORRHOIDS: Status: ACTIVE | Noted: 2020-08-30

## 2020-08-30 PROBLEM — K64.8 OTHER HEMORRHOIDS: Status: ACTIVE | Noted: 2020-08-28

## 2020-08-30 NOTE — ASSESSMENT & PLAN NOTE
A chronic asymptomatic fair control continue with the citalopram 20 mg once a day proper use discussed with the patient

## 2020-08-30 NOTE — ASSESSMENT & PLAN NOTE
A new diagnosis symptomatic will recommend the hydrocortisone 2 5% cream to apply it twice a day for 7-10 days and discussed the patient important regulate bowel movement avoid constipation explain the not sure of the problem with the patient important lose weight

## 2020-08-30 NOTE — ASSESSMENT & PLAN NOTE
Advice and education were given regarding nutrition, aerobic exercises, weight bearing exercises, cardiovascular risk reduction, fall risk reduction, and age appropriate supplements  The patient was counseled regarding instructions for management, risk factor reductions, prognosis, risks and benefits of treatment options, patient and family education, and importance of compliance with treatment       Discussed flu shot with the patient and show a tolerated well without any side effect

## 2020-08-30 NOTE — ASSESSMENT & PLAN NOTE
Chronic asymptomatic fair control continue current management discussed the patient important lose weight low-salt diet and increase physical activity

## 2020-08-30 NOTE — ASSESSMENT & PLAN NOTE
Chronic symptomatic today with the leg pain a discussed with the patient proper postural important lose weight recommend physical therapy she decline discussed the ibuprofen for the pain recommend to take it just for 7-10 days secondary patient history of hypertension

## 2020-08-30 NOTE — ASSESSMENT & PLAN NOTE
Chronic uncontrolled patient not interested in the bariatric option we discussed the patient portion control low carb low-fat diet and increased physical activity

## 2020-08-30 NOTE — ASSESSMENT & PLAN NOTE
A chronic stable patient already on Lasix 20 mg once a day discussed low-salt diet and important lose weight

## 2020-08-31 ENCOUNTER — TELEPHONE (OUTPATIENT)
Dept: FAMILY MEDICINE CLINIC | Facility: CLINIC | Age: 60
End: 2020-08-31

## 2020-08-31 RX ORDER — AMLODIPINE BESYLATE AND OLMESARTAN MEDOXOMIL 5; 40 MG/1; MG/1
TABLET, FILM COATED ORAL
Qty: 30 TABLET | Refills: 2 | Status: SHIPPED | OUTPATIENT
Start: 2020-08-31 | End: 2020-12-14

## 2020-09-28 ENCOUNTER — OFFICE VISIT (OUTPATIENT)
Dept: FAMILY MEDICINE CLINIC | Facility: CLINIC | Age: 60
End: 2020-09-28
Payer: COMMERCIAL

## 2020-09-28 VITALS
SYSTOLIC BLOOD PRESSURE: 120 MMHG | TEMPERATURE: 98.7 F | HEART RATE: 82 BPM | WEIGHT: 234 LBS | BODY MASS INDEX: 43.06 KG/M2 | DIASTOLIC BLOOD PRESSURE: 80 MMHG | OXYGEN SATURATION: 98 % | HEIGHT: 62 IN

## 2020-09-28 DIAGNOSIS — I10 ESSENTIAL HYPERTENSION: ICD-10-CM

## 2020-09-28 DIAGNOSIS — H60.391 OTHER INFECTIVE ACUTE OTITIS EXTERNA OF RIGHT EAR: Primary | ICD-10-CM

## 2020-09-28 DIAGNOSIS — R73.01 IMPAIRED FASTING GLUCOSE: ICD-10-CM

## 2020-09-28 DIAGNOSIS — K21.9 GASTROESOPHAGEAL REFLUX DISEASE WITHOUT ESOPHAGITIS: ICD-10-CM

## 2020-09-28 DIAGNOSIS — E03.9 ACQUIRED HYPOTHYROIDISM: ICD-10-CM

## 2020-09-28 DIAGNOSIS — E78.2 MIXED HYPERLIPIDEMIA: ICD-10-CM

## 2020-09-28 DIAGNOSIS — E55.9 VITAMIN D DEFICIENCY: ICD-10-CM

## 2020-09-28 PROCEDURE — 99214 OFFICE O/P EST MOD 30 MIN: CPT | Performed by: FAMILY MEDICINE

## 2020-09-28 RX ORDER — OFLOXACIN 3 MG/ML
10 SOLUTION AURICULAR (OTIC) DAILY
Qty: 5 ML | Refills: 0 | Status: SHIPPED | OUTPATIENT
Start: 2020-09-28 | End: 2020-10-05

## 2020-09-28 RX ORDER — ERGOCALCIFEROL 1.25 MG/1
50000 CAPSULE ORAL WEEKLY
Qty: 4 CAPSULE | Refills: 2 | Status: SHIPPED | OUTPATIENT
Start: 2020-09-28 | End: 2020-12-18

## 2020-09-28 NOTE — PROGRESS NOTES
Subjective:   Chief Complaint   Patient presents with    Follow-up     chronic conditions        Patient ID: Carlos Espitia is a 62 y o  female  Patient here for follow-up with a chronic condition and she is concerned about the right ear pain and she was clean her ear after shower and the she felt the pain 1 the a Q-tip touch her ears and and it the red the and a node drainage no bleeding no decreased hearing no ringing in the ear no fever no decrease in oral intake vomiting or upper story infection  The patient's history of and impaired fasting glucose try to controlled with the low carb diet deny increased thirsty increased frequency urination no headache and the and no abdomen pain patient history of hyperlipidemia on statin deny any rash or muscle pain and she is asymptomatic deny any chest pain short of breath no palpitation no TIA symptom patient history of vitamin-D deficiency has not been taking any vitamin-D supplement the for the last couple months deny any bone pain joint pain no muscle pain no fatigue  Recent blood work discussed with the patient      The following portions of the patient's history were reviewed and updated as appropriate: allergies, current medications, past family history, past medical history, past social history, past surgical history and problem list     Review of Systems   Constitutional: Negative for activity change, appetite change, fatigue and fever  HENT: Positive for ear pain  Negative for congestion, ear discharge, sinus pressure, sinus pain and sore throat  Eyes: Negative for pain, discharge, redness and itching  Respiratory: Negative for cough, chest tightness, shortness of breath and stridor  Cardiovascular: Negative for chest pain, palpitations and leg swelling  Gastrointestinal: Negative for abdominal pain, blood in stool, constipation, diarrhea and nausea  Genitourinary: Negative for dysuria, flank pain, frequency and hematuria     Musculoskeletal: Negative for back pain, joint swelling and neck pain  Skin: Negative for pallor and rash  Neurological: Negative for dizziness, tremors, weakness, numbness and headaches  Hematological: Does not bruise/bleed easily  Objective:  Vitals:    09/28/20 1103   BP: 120/80   Pulse: 82   Temp: 98 7 °F (37 1 °C)   TempSrc: Tympanic   SpO2: 98%   Weight: 106 kg (234 lb)   Height: 5' 2" (1 575 m)      Physical Exam  Vitals signs and nursing note reviewed  Constitutional:       General: She is not in acute distress  Appearance: She is well-developed  She is not diaphoretic  HENT:      Head: Normocephalic  Right Ear: Tympanic membrane normal       Left Ear: Tympanic membrane, ear canal and external ear normal       Ears:      Comments:  right external ear canal erythema and swelling and tender to touch     Nose: Nose normal  No congestion or rhinorrhea  Mouth/Throat:      Mouth: Mucous membranes are moist       Pharynx: Oropharynx is clear  No oropharyngeal exudate or posterior oropharyngeal erythema  Eyes:      General:         Right eye: No discharge  Left eye: No discharge  Conjunctiva/sclera: Conjunctivae normal       Pupils: Pupils are equal, round, and reactive to light  Neck:      Musculoskeletal: Normal range of motion and neck supple  Vascular: No JVD  Cardiovascular:      Rate and Rhythm: Normal rate and regular rhythm  Heart sounds: Normal heart sounds  No murmur  No gallop  Pulmonary:      Effort: Pulmonary effort is normal  No respiratory distress  Breath sounds: Normal breath sounds  No stridor  No wheezing or rales  Chest:      Chest wall: No tenderness  Abdominal:      General: There is no distension  Palpations: Abdomen is soft  There is no mass  Tenderness: There is no abdominal tenderness  There is no rebound  Musculoskeletal:         General: No tenderness  Lymphadenopathy:      Cervical: No cervical adenopathy  Skin:     General: Skin is warm  Findings: No erythema or rash  Neurological:      Mental Status: She is alert and oriented to person, place, and time  Motor: No weakness  Gait: Gait normal            Assessment/Plan:    Hypothyroidism  Chronic asymptomatic fair control continue current dose of levothyroxine to 112 micrograms once a day proper use discussed with the patient    Impaired fasting glucose  A chronic asymptomatic fair control encouraged patient to continue with the low carb diet discussed important lose weight    Mixed hyperlipidemia  Chronic asymptomatic fair control continue with atorvastatin 20 milligram once a day low-fat diet discussed with the patient    Vitamin D deficiency  A chronic uncontrolled asymptomatic start vitamin-D 52390 International Units once a week for 12 week proper use and possible side effect discussed with the patient    Otitis externa  Acute symptomatic start the of flexor seen otic drop the to put 10 drop once a day for 7 days proper use and possible side effect discussed the patient if there is no improvement to call the office       Diagnoses and all orders for this visit:    Other infective acute otitis externa of right ear  -     ofloxacin (FLOXIN) 0 3 % otic solution; Administer 10 drops to the right ear daily for 7 days    Gastroesophageal reflux disease without esophagitis    Impaired fasting glucose  -     CBC and differential; Future  -     Basic metabolic panel; Future  -     Lipid panel; Future  -     TSH, 3rd generation with Free T4 reflex; Future    Essential hypertension  -     CBC and differential; Future  -     Basic metabolic panel; Future  -     Lipid panel; Future  -     TSH, 3rd generation with Free T4 reflex; Future    Acquired hypothyroidism  -     CBC and differential; Future  -     Basic metabolic panel; Future  -     Lipid panel; Future  -     TSH, 3rd generation with Free T4 reflex;  Future    Vitamin D deficiency  - ergocalciferol (VITAMIN D2) 50,000 units; Take 1 capsule (50,000 Units total) by mouth once a week  -     CBC and differential; Future  -     Basic metabolic panel; Future  -     Lipid panel; Future  -     TSH, 3rd generation with Free T4 reflex;  Future    Mixed hyperlipidemia    Other orders  -     hydrocortisone 2 5 % cream; hydrocortisone 2 5 % topical cream with perineal applicator   APPLY TO AFFECTED AREA TWICE A DAY

## 2020-09-30 PROBLEM — H60.90 OTITIS EXTERNA: Status: ACTIVE | Noted: 2020-09-28

## 2020-09-30 PROBLEM — H60.90 OTITIS EXTERNA: Status: ACTIVE | Noted: 2020-09-30

## 2020-09-30 NOTE — ASSESSMENT & PLAN NOTE
A chronic uncontrolled asymptomatic start vitamin-D 56562 International Units once a week for 12 week proper use and possible side effect discussed with the patient

## 2020-09-30 NOTE — ASSESSMENT & PLAN NOTE
Chronic asymptomatic fair control continue current dose of levothyroxine to 112 micrograms once a day proper use discussed with the patient

## 2020-09-30 NOTE — ASSESSMENT & PLAN NOTE
A chronic asymptomatic fair control encouraged patient to continue with the low carb diet discussed important lose weight

## 2020-09-30 NOTE — ASSESSMENT & PLAN NOTE
Chronic asymptomatic fair control continue with atorvastatin 20 milligram once a day low-fat diet discussed with the patient

## 2020-09-30 NOTE — ASSESSMENT & PLAN NOTE
Acute symptomatic start the of flexor seen otic drop the to put 10 drop once a day for 7 days proper use and possible side effect discussed the patient if there is no improvement to call the office

## 2020-11-05 ENCOUNTER — TELEPHONE (OUTPATIENT)
Dept: FAMILY MEDICINE CLINIC | Facility: CLINIC | Age: 60
End: 2020-11-05

## 2020-11-05 ENCOUNTER — TELEMEDICINE (OUTPATIENT)
Dept: FAMILY MEDICINE CLINIC | Facility: CLINIC | Age: 60
End: 2020-11-05
Payer: COMMERCIAL

## 2020-11-05 VITALS — TEMPERATURE: 97.9 F

## 2020-11-05 DIAGNOSIS — J01.01 ACUTE RECURRENT MAXILLARY SINUSITIS: Primary | ICD-10-CM

## 2020-11-05 PROCEDURE — 99213 OFFICE O/P EST LOW 20 MIN: CPT | Performed by: FAMILY MEDICINE

## 2020-11-05 RX ORDER — AZITHROMYCIN 250 MG/1
TABLET, FILM COATED ORAL
Qty: 6 TABLET | Refills: 0 | Status: SHIPPED | OUTPATIENT
Start: 2020-11-05 | End: 2020-11-10

## 2020-11-05 RX ORDER — METHYLPREDNISOLONE 4 MG/1
TABLET ORAL
Qty: 21 EACH | Refills: 0 | Status: SHIPPED | OUTPATIENT
Start: 2020-11-05 | End: 2021-01-04 | Stop reason: SDUPTHER

## 2020-11-30 DIAGNOSIS — J30.89 SEASONAL ALLERGIC RHINITIS DUE TO OTHER ALLERGIC TRIGGER: ICD-10-CM

## 2020-11-30 RX ORDER — MONTELUKAST SODIUM 10 MG/1
TABLET ORAL
Qty: 90 TABLET | Refills: 0 | Status: SHIPPED | OUTPATIENT
Start: 2020-11-30 | End: 2021-01-13 | Stop reason: SDUPTHER

## 2020-12-14 DIAGNOSIS — I10 ESSENTIAL HYPERTENSION: ICD-10-CM

## 2020-12-14 RX ORDER — AMLODIPINE BESYLATE AND OLMESARTAN MEDOXOMIL 5; 40 MG/1; MG/1
TABLET, FILM COATED ORAL
Qty: 90 TABLET | Refills: 0 | Status: SHIPPED | OUTPATIENT
Start: 2020-12-14 | End: 2021-01-13 | Stop reason: SDUPTHER

## 2020-12-18 DIAGNOSIS — E55.9 VITAMIN D DEFICIENCY: ICD-10-CM

## 2020-12-18 RX ORDER — ERGOCALCIFEROL 1.25 MG/1
CAPSULE ORAL
Qty: 4 CAPSULE | Refills: 2 | Status: SHIPPED | OUTPATIENT
Start: 2020-12-18 | End: 2021-01-13 | Stop reason: ALTCHOICE

## 2020-12-19 ENCOUNTER — HOSPITAL ENCOUNTER (OUTPATIENT)
Dept: MAMMOGRAPHY | Facility: CLINIC | Age: 60
Discharge: HOME | End: 2020-12-19
Payer: COMMERCIAL

## 2020-12-19 VITALS — WEIGHT: 234 LBS | HEIGHT: 62 IN | BODY MASS INDEX: 43.06 KG/M2

## 2020-12-19 DIAGNOSIS — Z12.31 SCREENING MAMMOGRAM, ENCOUNTER FOR: ICD-10-CM

## 2020-12-19 PROCEDURE — 77067 SCR MAMMO BI INCL CAD: CPT

## 2020-12-19 PROCEDURE — 77063 BREAST TOMOSYNTHESIS BI: CPT

## 2020-12-21 ENCOUNTER — TELEPHONE (OUTPATIENT)
Dept: FAMILY MEDICINE CLINIC | Facility: CLINIC | Age: 60
End: 2020-12-21

## 2020-12-21 DIAGNOSIS — Z12.31 ENCOUNTER FOR SCREENING MAMMOGRAM FOR MALIGNANT NEOPLASM OF BREAST: Primary | ICD-10-CM

## 2021-01-03 DIAGNOSIS — F32.A DEPRESSIVE DISORDER: ICD-10-CM

## 2021-01-03 RX ORDER — CITALOPRAM 20 MG/1
TABLET ORAL
Qty: 90 TABLET | Refills: 0 | Status: SHIPPED | OUTPATIENT
Start: 2021-01-03

## 2021-01-04 ENCOUNTER — TELEPHONE (OUTPATIENT)
Dept: FAMILY MEDICINE CLINIC | Facility: CLINIC | Age: 61
End: 2021-01-04

## 2021-01-04 ENCOUNTER — TELEMEDICINE (OUTPATIENT)
Dept: FAMILY MEDICINE CLINIC | Facility: CLINIC | Age: 61
End: 2021-01-04
Payer: COMMERCIAL

## 2021-01-04 VITALS — TEMPERATURE: 97.7 F

## 2021-01-04 DIAGNOSIS — J30.89 SEASONAL ALLERGIC RHINITIS DUE TO OTHER ALLERGIC TRIGGER: ICD-10-CM

## 2021-01-04 DIAGNOSIS — J01.01 ACUTE RECURRENT MAXILLARY SINUSITIS: Primary | ICD-10-CM

## 2021-01-04 PROCEDURE — 99214 OFFICE O/P EST MOD 30 MIN: CPT | Performed by: FAMILY MEDICINE

## 2021-01-04 RX ORDER — AZITHROMYCIN 250 MG/1
TABLET, FILM COATED ORAL
Qty: 6 TABLET | Refills: 0 | Status: SHIPPED | OUTPATIENT
Start: 2021-01-04 | End: 2021-01-08

## 2021-01-04 RX ORDER — METHYLPREDNISOLONE 4 MG/1
TABLET ORAL
Qty: 21 EACH | Refills: 0 | Status: SHIPPED | OUTPATIENT
Start: 2021-01-04 | End: 2021-01-13 | Stop reason: ALTCHOICE

## 2021-01-04 NOTE — ASSESSMENT & PLAN NOTE
Acute symptomatic recurrent not responding is a conservative treatment has been going on for more than 10 days recommend to start her on antibiotic Zithromax and Medrol pack as directed the patient had recurrent sinus infection recommend to have a CT scan of the sinus she will hold on that for now

## 2021-01-04 NOTE — ASSESSMENT & PLAN NOTE
Symptomatic recommend patient use Flonase nasal spray 2 spray in each nostril once a day Singulair 10 mg once a day and fexofenadine 180 mg once a day proper use and possible side effect review with the patient

## 2021-01-04 NOTE — PROGRESS NOTES
Virtual Regular Visit      Assessment/Plan:    Problem List Items Addressed This Visit        Respiratory    Allergic rhinitis     Symptomatic recommend patient use Flonase nasal spray 2 spray in each nostril once a day Singulair 10 mg once a day and fexofenadine 180 mg once a day proper use and possible side effect review with the patient         Relevant Medications    methylPREDNISolone 4 MG tablet therapy pack    Acute recurrent maxillary sinusitis - Primary     Acute symptomatic recurrent not responding is a conservative treatment has been going on for more than 10 days recommend to start her on antibiotic Zithromax and Medrol pack as directed the patient had recurrent sinus infection recommend to have a CT scan of the sinus she will hold on that for now         Relevant Medications    azithromycin (ZITHROMAX) 250 mg tablet    methylPREDNISolone 4 MG tablet therapy pack               Reason for visit is   Chief Complaint   Patient presents with    Virtual Regular Visit        Encounter provider Audrey Puente MD    Provider located at [facility] AT [address] [address] [phone]      Recent Visits  No visits were found meeting these conditions  Showing recent visits within past 7 days and meeting all other requirements     Today's Visits  Date Type Provider Dept   01/04/21 Telemedicine Audrey Puente MD P[facility]  [facility]   01/04/21 Telephone [address]   Showing today's visits and meeting all other requirements     Future Appointments  No visits were found meeting these conditions  Showing future appointments within next 150 days and meeting all other requirements        The patient was identified by name and date of birth   Jordan Lema was informed that this is a telemedicine visit and that the visit is being conducted through [address] [place] [address] and patient was informed that this is not a secure, HIPAA-compliant platform  She agrees to proceed     My office door was closed  No one else was in the room  She acknowledged consent and understanding of privacy and security of the video platform  The patient has agreed to participate and understands they can discontinue the visit at any time  Patient is aware this is a billable service  Subjective  Nehemias Regalado is a 62 y o  female       Sinus Problem  This is a recurrent problem  The current episode started 1 to 4 weeks ago  The problem has been gradually worsening since onset  There has been no fever  The pain is moderate  Associated symptoms include chills, congestion, diaphoresis, ear pain, sinus pressure and a sore throat  Pertinent negatives include no coughing, headaches, hoarse voice, neck pain, shortness of breath or sneezing  Past treatments include acetaminophen, spray decongestants and saline sprays  The treatment provided no relief          Past Medical History:   Diagnosis Date    Allergic rhinitis     Cardiac murmur     Depression     GERD (gastroesophageal reflux disease)     Hyperlipidemia     Hypertension     Hypothyroidism     IFG (impaired fasting glucose)     Obesity     Osteoarthritis of spine with radiculopathy, lumbosacral region 2019    Vitamin D deficiency        Past Surgical History:   Procedure Laterality Date     SECTION      3       Current Outpatient Medications   Medication Sig Dispense Refill    atorvastatin (LIPITOR) 20 mg tablet Take 1 tablet (20 mg total) by mouth daily 90 tablet 0    azithromycin (ZITHROMAX) 250 mg tablet Take 2 tablets today then 1 tablet daily x 4 days 6 tablet 0    Du 5-40 MG TAKE 1 TABLET BY MOUTH EVERY DAY 90 tablet 0    citalopram (CeleXA) 20 mg tablet TAKE 1 TABLET BY MOUTH EVERY DAY 90 tablet 0    Diclofenac Sodium (Voltaren) 1 % Voltaren 1 % topical gel   APPLY 2 GRAMS TO THE AFFECTED AREA(S) BY TOPICAL ROUTE 4 TIMES PER DAY PRN      ergocalciferol (VITAMIN D2) 50,000 units TAKE 1 CAPSULE BY MOUTH ONE TIME PER WEEK *NOT COVERED 4 capsule 2    fexofenadine (ALLEGRA ALLERGY) 180 MG tablet take 1 tablet (180MG)  by oral route  every day      fluticasone (FLONASE) 50 mcg/act nasal spray as needed      furosemide (LASIX) 20 mg tablet Take 1 tablet (20 mg total) by mouth daily 90 tablet 0    hydrocortisone (ANUSOL-HC) 2 5 % rectal cream Apply topically 2 (two) times a day 28 g 1    ibuprofen (MOTRIN) 600 mg tablet Take 1 tablet (600 mg total) by mouth every 6 (six) hours as needed for mild pain 30 tablet 0    levothyroxine 112 mcg tablet Take 1 tablet (112 mcg total) by mouth daily 90 tablet 0    methylPREDNISolone 4 MG tablet therapy pack Use as directed on package 21 each 0    montelukast (SINGULAIR) 10 mg tablet TAKE 1 TABLET BY MOUTH DAILY AT BEDTIME 90 tablet 0     No current facility-administered medications for this visit  Allergies   Allergen Reactions    No Active Allergies        Review of Systems   Constitutional: Positive for chills and diaphoresis  Negative for activity change, appetite change, fatigue and fever  HENT: Positive for congestion, ear pain, postnasal drip, sinus pressure, sinus pain and sore throat  Negative for hoarse voice, sneezing, trouble swallowing and voice change  Eyes: Negative for pain, discharge, redness and itching  Respiratory: Negative for cough, chest tightness, shortness of breath and stridor  Cardiovascular: Negative for chest pain, palpitations and leg swelling  Gastrointestinal: Negative for abdominal pain, blood in stool, constipation, diarrhea and nausea  Genitourinary: Negative for dysuria, flank pain, frequency and hematuria  Musculoskeletal: Negative for back pain, joint swelling and neck pain  Skin: Negative for pallor and rash  Neurological: Negative for dizziness, tremors, weakness, numbness and headaches  Hematological: Does not bruise/bleed easily  Video Exam    Vitals:    01/04/21 1108   Temp: 97 7 °F (36 5 °C)       Physical Exam  Vitals signs reviewed  Constitutional:       General: She is not in acute distress  Appearance: She is obese  She is not ill-appearing, toxic-appearing or diaphoretic  HENT:      Head: Normocephalic and atraumatic  Nose: Congestion and rhinorrhea present  Mouth/Throat:      Pharynx: Oropharynx is clear  No oropharyngeal exudate or posterior oropharyngeal erythema  Eyes:      General: No scleral icterus  Right eye: No discharge  Left eye: No discharge  Conjunctiva/sclera: Conjunctivae normal    Neck:      Musculoskeletal: Normal range of motion  Pulmonary:      Effort: Pulmonary effort is normal  No respiratory distress  Abdominal:      General: Abdomen is flat  There is no distension  Musculoskeletal:         General: No signs of injury  Right lower leg: No edema  Left lower leg: No edema  Skin:     Findings: No erythema or rash  Neurological:      Mental Status: She is alert and oriented to person, place, and time  Psychiatric:         Mood and Affect: Mood normal           I spent 15 minutes directly with the patient during this visit      VIRTUAL VISIT DISCLAIMER    Sheri Sanchez acknowledges that she has consented to an online visit or consultation  She understands that the online visit is based solely on information provided by her, and that, in the absence of a face-to-face physical evaluation by the physician, the diagnosis she receives is both limited and provisional in terms of accuracy and completeness  This is not intended to replace a full medical face-to-face evaluation by the physician  Sheri Sanchez understands and accepts these terms

## 2021-01-13 ENCOUNTER — OFFICE VISIT (OUTPATIENT)
Dept: FAMILY MEDICINE CLINIC | Facility: CLINIC | Age: 61
End: 2021-01-13
Payer: COMMERCIAL

## 2021-01-13 VITALS
BODY MASS INDEX: 43.43 KG/M2 | HEIGHT: 62 IN | DIASTOLIC BLOOD PRESSURE: 70 MMHG | SYSTOLIC BLOOD PRESSURE: 130 MMHG | WEIGHT: 236 LBS | TEMPERATURE: 97.8 F | OXYGEN SATURATION: 97 % | HEART RATE: 86 BPM

## 2021-01-13 DIAGNOSIS — J30.89 SEASONAL ALLERGIC RHINITIS DUE TO OTHER ALLERGIC TRIGGER: ICD-10-CM

## 2021-01-13 DIAGNOSIS — I10 ESSENTIAL HYPERTENSION: ICD-10-CM

## 2021-01-13 DIAGNOSIS — E78.2 MIXED HYPERLIPIDEMIA: ICD-10-CM

## 2021-01-13 DIAGNOSIS — R60.0 LOWER EXTREMITY EDEMA: ICD-10-CM

## 2021-01-13 DIAGNOSIS — M47.27 OSTEOARTHRITIS OF SPINE WITH RADICULOPATHY, LUMBOSACRAL REGION: ICD-10-CM

## 2021-01-13 DIAGNOSIS — E55.9 VITAMIN D DEFICIENCY: ICD-10-CM

## 2021-01-13 DIAGNOSIS — R07.89 OTHER CHEST PAIN: Primary | ICD-10-CM

## 2021-01-13 DIAGNOSIS — E03.9 ACQUIRED HYPOTHYROIDISM: ICD-10-CM

## 2021-01-13 DIAGNOSIS — E66.01 MORBID OBESITY WITH BMI OF 40.0-44.9, ADULT (HCC): ICD-10-CM

## 2021-01-13 PROCEDURE — 93000 ELECTROCARDIOGRAM COMPLETE: CPT | Performed by: FAMILY MEDICINE

## 2021-01-13 PROCEDURE — 99214 OFFICE O/P EST MOD 30 MIN: CPT | Performed by: FAMILY MEDICINE

## 2021-01-13 RX ORDER — AMLODIPINE BESYLATE AND OLMESARTAN MEDOXOMIL 5; 40 MG/1; MG/1
1 TABLET, FILM COATED ORAL DAILY
Qty: 90 TABLET | Refills: 0 | Status: SHIPPED | OUTPATIENT
Start: 2021-01-13

## 2021-01-13 RX ORDER — ATORVASTATIN CALCIUM 20 MG/1
20 TABLET, FILM COATED ORAL DAILY
Qty: 90 TABLET | Refills: 0 | Status: SHIPPED | OUTPATIENT
Start: 2021-01-13 | End: 2021-02-21

## 2021-01-13 RX ORDER — MONTELUKAST SODIUM 10 MG/1
10 TABLET ORAL
Qty: 90 TABLET | Refills: 0 | Status: SHIPPED | OUTPATIENT
Start: 2021-01-13

## 2021-01-13 RX ORDER — LEVOTHYROXINE SODIUM 112 UG/1
112 TABLET ORAL DAILY
Qty: 90 TABLET | Refills: 0 | Status: SHIPPED | OUTPATIENT
Start: 2021-01-13 | End: 2021-02-21

## 2021-01-13 RX ORDER — FUROSEMIDE 20 MG/1
20 TABLET ORAL DAILY
Qty: 90 TABLET | Refills: 0 | Status: SHIPPED | OUTPATIENT
Start: 2021-01-13

## 2021-01-13 NOTE — PROGRESS NOTES
BMI Counseling: Body mass index is 43 16 kg/m²  The BMI is above normal  Nutrition recommendations include decreasing portion sizes, consuming healthier snacks and limiting drinks that contain sugar  Exercise recommendations include exercising 3-5 times per week  No pharmacotherapy was ordered  Patient referred to PCP due to patient being overweight  Subjective:   Chief Complaint   Patient presents with    Other     chest congestion and discomfort    Earache        Patient ID: Kely Baird is a 62 y o  female  The patient today concerned chest pain she described it as a pressure radiate to her back radiate to her left upper arm and started the almost couple days ago and and it is worse with certain postural of the body and specially when she no forward deny any cough wheezing no hematosis and no headache no dyspnea on exertion no worsening in her lower extremity edema patient recently diagnosed with the sinus infection treated with antibiotic she felt better after the antibiotic and the Medrol pack for couple days with the symptom came back and she had the congestion postnasal drip and ear pain      The following portions of the patient's history were reviewed and updated as appropriate: allergies, current medications, past family history, past medical history, past social history, past surgical history and problem list     Review of Systems   Constitutional: Negative for activity change, appetite change, fatigue and fever  HENT: Positive for congestion and ear pain  Negative for sinus pressure, sinus pain and sore throat  Eyes: Negative for pain, discharge, redness and itching  Respiratory: Negative for cough, chest tightness, shortness of breath, wheezing and stridor  Cardiovascular: Positive for chest pain  Negative for palpitations and leg swelling  Gastrointestinal: Negative for abdominal pain, blood in stool, constipation, diarrhea and nausea     Genitourinary: Negative for dysuria, flank pain, frequency and hematuria  Musculoskeletal: Negative for back pain, joint swelling and neck pain  Skin: Negative for pallor and rash  Neurological: Negative for dizziness, tremors, weakness, numbness and headaches  Hematological: Does not bruise/bleed easily  Objective:  Vitals:    01/13/21 1030   BP: 130/70   Pulse: 86   Temp: 97 8 °F (36 6 °C)   TempSrc: Tympanic   SpO2: 97%   Weight: 107 kg (236 lb)   Height: 5' 2" (1 575 m)      Physical Exam  Vitals signs and nursing note reviewed  Constitutional:       General: She is not in acute distress  Appearance: She is well-developed  She is not diaphoretic  HENT:      Head: Normocephalic  Right Ear: Tympanic membrane, ear canal and external ear normal       Left Ear: Tympanic membrane, ear canal and external ear normal       Nose: Nose normal  No congestion or rhinorrhea  Mouth/Throat:      Mouth: Mucous membranes are moist       Pharynx: Oropharynx is clear  No oropharyngeal exudate or posterior oropharyngeal erythema  Eyes:      General:         Right eye: No discharge  Left eye: No discharge  Conjunctiva/sclera: Conjunctivae normal       Pupils: Pupils are equal, round, and reactive to light  Neck:      Musculoskeletal: Normal range of motion and neck supple  Vascular: No JVD  Cardiovascular:      Rate and Rhythm: Normal rate and regular rhythm  Heart sounds: Normal heart sounds  No murmur  No gallop  Pulmonary:      Effort: Pulmonary effort is normal  No respiratory distress  Breath sounds: Normal breath sounds  No stridor  No wheezing or rales  Chest:      Chest wall: No tenderness  Abdominal:      General: There is no distension  Palpations: Abdomen is soft  There is no mass  Tenderness: There is no abdominal tenderness  There is no rebound  Musculoskeletal:         General: No tenderness  Lymphadenopathy:      Cervical: No cervical adenopathy     Skin: General: Skin is warm  Findings: No erythema or rash  Neurological:      Mental Status: She is alert and oriented to person, place, and time  Motor: No weakness  Gait: Gait normal            Assessment/Plan:    Other chest pain  A new diagnosis patient have a multiple risk factor including hypertension hyperlipidemia obesity and EKG in the office is normal plan for echocardiogram plan for KEVIN and C-reactive protein and the a chest x-ray    Morbid obesity with BMI of 40 0-44 9, adult (HCC)  A uncontrolled discussed the patient portion control low carb low-fat diet and increased physical activity    Allergic rhinitis  symptomatic with the ear pain nasal congestion patient currently had a course of prednisone and the antibiotic to treat sinus infection recommend a to start the Singulair 10 mg once a day and continue well hydration continue Flonase nasal spray 2 spray in each nostril once a day proper use and possible side effect discussed the patient       Diagnoses and all orders for this visit:    Other chest pain  -     CBC and differential; Future  -     Comprehensive metabolic panel; Future  -     Lipid Panel with Direct LDL reflex; Future  -     TSH, 3rd generation; Future  -     Hemoglobin A1C; Future  -     Vitamin D 25 hydroxy; Future  -     KEVIN Screen w/ Reflex to Titer/Pattern; Future  -     C-reactive protein; Future  -     XR chest pa & lateral; Future  -     Echo complete with contrast if indicated; Future  -     POCT ECG    Seasonal allergic rhinitis due to other allergic trigger  -     montelukast (SINGULAIR) 10 mg tablet; Take 1 tablet (10 mg total) by mouth daily at bedtime    Morbid obesity with BMI of 40 0-44 9, adult (HCC)    Mixed hyperlipidemia  -     atorvastatin (LIPITOR) 20 mg tablet; Take 1 tablet (20 mg total) by mouth daily  -     CBC and differential; Future  -     Comprehensive metabolic panel; Future  -     Lipid Panel with Direct LDL reflex;  Future  -     TSH, 3rd generation; Future  -     Hemoglobin A1C; Future  -     Vitamin D 25 hydroxy; Future  -     KEVIN Screen w/ Reflex to Titer/Pattern; Future  -     C-reactive protein; Future    Essential hypertension  -     Du 5-40 MG; Take 1 tablet by mouth daily  -     CBC and differential; Future  -     Comprehensive metabolic panel; Future  -     Lipid Panel with Direct LDL reflex; Future  -     TSH, 3rd generation; Future  -     Hemoglobin A1C; Future  -     Vitamin D 25 hydroxy; Future  -     KEVIN Screen w/ Reflex to Titer/Pattern; Future  -     C-reactive protein; Future    Lower extremity edema  -     furosemide (LASIX) 20 mg tablet; Take 1 tablet (20 mg total) by mouth daily    Osteoarthritis of spine with radiculopathy, lumbosacral region    Acquired hypothyroidism  -     levothyroxine 112 mcg tablet; Take 1 tablet (112 mcg total) by mouth daily  -     CBC and differential; Future  -     Comprehensive metabolic panel; Future  -     Lipid Panel with Direct LDL reflex; Future  -     TSH, 3rd generation; Future  -     Hemoglobin A1C; Future  -     Vitamin D 25 hydroxy; Future  -     KEVIN Screen w/ Reflex to Titer/Pattern; Future  -     C-reactive protein; Future    Vitamin D deficiency  -     CBC and differential; Future  -     Comprehensive metabolic panel; Future  -     Lipid Panel with Direct LDL reflex; Future  -     TSH, 3rd generation; Future  -     Hemoglobin A1C; Future  -     Vitamin D 25 hydroxy; Future  -     KEVIN Screen w/ Reflex to Titer/Pattern; Future  -     C-reactive protein;  Future

## 2021-01-15 LAB — HBA1C MFR BLD HPLC: 6.1 %

## 2021-01-15 NOTE — ASSESSMENT & PLAN NOTE
symptomatic with the ear pain nasal congestion patient currently had a course of prednisone and the antibiotic to treat sinus infection recommend a to start the Singulair 10 mg once a day and continue well hydration continue Flonase nasal spray 2 spray in each nostril once a day proper use and possible side effect discussed the patient

## 2021-01-15 NOTE — ASSESSMENT & PLAN NOTE
JS waters discussed the patient portion control low carb low-fat diet and increased physical activity

## 2021-01-15 NOTE — ASSESSMENT & PLAN NOTE
A new diagnosis patient have a multiple risk factor including hypertension hyperlipidemia obesity and EKG in the office is normal plan for echocardiogram plan for KEVIN and C-reactive protein and the a chest x-ray

## 2021-01-18 ENCOUNTER — TELEPHONE (OUTPATIENT)
Dept: FAMILY MEDICINE CLINIC | Facility: CLINIC | Age: 61
End: 2021-01-18

## 2021-01-19 ENCOUNTER — HOSPITAL ENCOUNTER (OUTPATIENT)
Dept: RADIOLOGY | Facility: HOSPITAL | Age: 61
Discharge: HOME | End: 2021-01-19
Payer: COMMERCIAL

## 2021-01-19 DIAGNOSIS — R07.89 OTHER CHEST PAIN: ICD-10-CM

## 2021-01-19 PROCEDURE — 71046 X-RAY EXAM CHEST 2 VIEWS: CPT

## 2021-01-26 ENCOUNTER — HOSPITAL ENCOUNTER (OUTPATIENT)
Dept: NON INVASIVE DIAGNOSTICS | Facility: HOSPITAL | Age: 61
Discharge: HOME | End: 2021-01-26
Payer: COMMERCIAL

## 2021-01-26 DIAGNOSIS — R07.89 OTHER CHEST PAIN: ICD-10-CM

## 2021-01-26 PROCEDURE — 93306 TTE W/DOPPLER COMPLETE: CPT

## 2021-01-26 PROCEDURE — 93306 TTE W/DOPPLER COMPLETE: CPT | Performed by: INTERNAL MEDICINE

## 2021-01-29 ENCOUNTER — OFFICE VISIT (OUTPATIENT)
Dept: FAMILY MEDICINE CLINIC | Facility: CLINIC | Age: 61
End: 2021-01-29
Payer: COMMERCIAL

## 2021-01-29 VITALS
HEART RATE: 82 BPM | HEIGHT: 62 IN | SYSTOLIC BLOOD PRESSURE: 130 MMHG | TEMPERATURE: 99 F | WEIGHT: 238 LBS | BODY MASS INDEX: 43.79 KG/M2 | DIASTOLIC BLOOD PRESSURE: 80 MMHG | OXYGEN SATURATION: 97 %

## 2021-01-29 DIAGNOSIS — E03.9 ACQUIRED HYPOTHYROIDISM: ICD-10-CM

## 2021-01-29 DIAGNOSIS — R06.02 SHORT OF BREATH ON EXERTION: Primary | ICD-10-CM

## 2021-01-29 DIAGNOSIS — I10 ESSENTIAL HYPERTENSION: ICD-10-CM

## 2021-01-29 DIAGNOSIS — I51.7 CARDIOMEGALY: ICD-10-CM

## 2021-01-29 DIAGNOSIS — R73.01 IMPAIRED FASTING GLUCOSE: ICD-10-CM

## 2021-01-29 PROBLEM — E66.01 MORBID OBESITY (HCC): Status: ACTIVE | Noted: 2021-01-29

## 2021-01-29 PROBLEM — R79.82 CRP ELEVATED: Status: ACTIVE | Noted: 2021-01-29

## 2021-01-29 PROCEDURE — 99214 OFFICE O/P EST MOD 30 MIN: CPT | Performed by: FAMILY MEDICINE

## 2021-01-29 NOTE — PROGRESS NOTES
Subjective:   Chief Complaint   Patient presents with    Follow-up     chronic conditions        Patient ID: Delores Manzo is a 62 y o  female  A patient here follow-up with a chronic condition and she is concerned about short of breast patient notice lately and she is getting out of breast quickly even if she did the her routine daily activity patient a she did not clean [place] decoration yet because she feel if she do little bit she get tired she deny any cough or wheezing  No hematosis no worsening in her lower extremity edema and no palpitation sometimes she get  A chest pain and she described it and pressure when she lied down on her left side   the patient has been gaining weight and have hard time to lose weight she had history of the hyperlipidemia hypertension and recent echocardiogram it show cardiomegaly   patient history of hypothyroidism on levothyroxine tolerated well without any side effect deny heat or cold intolerance mood is stable she had history of impaired fasting glucose try to controlled with the low carb diet deny increased thirsty increased frequency urination no dizziness no headache and no abdomen pain patient history of hypertension on Du 5-40 tolerated well deny any side effect blood pressure fair controlled recent blood work review the patient echocardiogram and chest x-ray review with the patient      The following portions of the patient's history were reviewed and updated as appropriate: allergies, current medications, past family history, past medical history, past social history, past surgical history and problem list     Review of Systems   Constitutional: Negative for activity change, appetite change, fatigue and fever  HENT: Negative for congestion, ear pain, sinus pressure, sinus pain and sore throat  Eyes: Negative for pain, discharge, redness and itching  Respiratory: Positive for shortness of breath  Negative for cough, chest tightness and stridor  Cardiovascular: Positive for chest pain  Negative for palpitations and leg swelling  Gastrointestinal: Negative for abdominal pain, blood in stool, constipation, diarrhea and nausea  Genitourinary: Negative for dysuria, flank pain, frequency and hematuria  Musculoskeletal: Negative for back pain, joint swelling and neck pain  Skin: Negative for pallor and rash  Neurological: Negative for dizziness, tremors, weakness, numbness and headaches  Hematological: Does not bruise/bleed easily  Objective:  Vitals:    01/29/21 1356 01/29/21 1418   BP: 140/80 130/80   Pulse: 82    Temp: 99 °F (37 2 °C)    TempSrc: Tympanic    SpO2: 97%    Weight: 108 kg (238 lb)    Height: 5' 2" (1 575 m)       Physical Exam  Vitals signs and nursing note reviewed  Constitutional:       General: She is not in acute distress  Appearance: She is well-developed  She is not diaphoretic  HENT:      Head: Normocephalic  Right Ear: Tympanic membrane, ear canal and external ear normal       Left Ear: Tympanic membrane, ear canal and external ear normal       Nose: Nose normal  No congestion or rhinorrhea  Mouth/Throat:      Mouth: Mucous membranes are moist       Pharynx: Oropharynx is clear  No oropharyngeal exudate or posterior oropharyngeal erythema  Eyes:      General:         Right eye: No discharge  Left eye: No discharge  Conjunctiva/sclera: Conjunctivae normal       Pupils: Pupils are equal, round, and reactive to light  Neck:      Musculoskeletal: Normal range of motion and neck supple  Vascular: No JVD  Cardiovascular:      Rate and Rhythm: Normal rate and regular rhythm  Heart sounds: Normal heart sounds  No murmur  No gallop  Pulmonary:      Effort: Pulmonary effort is normal  No respiratory distress  Breath sounds: Normal breath sounds  No stridor  No wheezing or rales  Chest:      Chest wall: No tenderness  Abdominal:      General: There is no distension  Palpations: Abdomen is soft  There is no mass  Tenderness: There is no abdominal tenderness  There is no rebound  Musculoskeletal:         General: No tenderness  Lymphadenopathy:      Cervical: No cervical adenopathy  Skin:     General: Skin is warm  Findings: No erythema or rash  Neurological:      Mental Status: She is alert and oriented to person, place, and time  Motor: No weakness  Gait: Gait normal            Assessment/Plan:    Short of breath on exertion   A new diagnosis the short of breath on exertion even her daily activity recent echocardiogram it show her ejection fraction 70% and she has cardiomegaly  Patient was history of the hypertension and obesity and the hyperlipidemia I do recommend to the patient to be evaluated by Cardiology for further workup discussed important lose weight and InCase short of breath get worse to go the emergency room    Cardiomegaly   The finding on recent echocardiogram patient have a history of the hypertension obesity hyperlipidemia she symptomatic with short of breath recommend patient to continue with the Du continue with statin patient will follow-up with the Cardiology    Essential hypertension   Chronic asymptomatic fair control continue Du 5-40 mg once a day low-salt diet and important lose weight review with the patient    Impaired fasting glucose   Chronic asymptomatic fair control encouraged patient to watch for the low carb diet and important lose weight    Hypothyroidism   Chronic asymptomatic fair control continue with the levothyroxine 112 mcg once a day       Diagnoses and all orders for this visit:    Short of breath on exertion  -     Ambulatory referral to Cardiology; Future  -     CBC and differential; Future  -     Comprehensive metabolic panel; Future  -     Lipid panel; Future  -     TSH, 3rd generation with Free T4 reflex;  Future  -     Hemoglobin A1C; Future    Cardiomegaly  -     Ambulatory referral to Cardiology; Future  -     CBC and differential; Future  -     Comprehensive metabolic panel; Future  -     Lipid panel; Future  -     TSH, 3rd generation with Free T4 reflex; Future  -     Hemoglobin A1C; Future    Acquired hypothyroidism  -     CBC and differential; Future  -     Comprehensive metabolic panel; Future  -     Lipid panel; Future  -     TSH, 3rd generation with Free T4 reflex; Future  -     Hemoglobin A1C; Future    Impaired fasting glucose  -     CBC and differential; Future  -     Comprehensive metabolic panel; Future  -     Lipid panel; Future  -     TSH, 3rd generation with Free T4 reflex;  Future  -     Hemoglobin A1C; Future    Essential hypertension

## 2021-01-29 NOTE — ASSESSMENT & PLAN NOTE
The finding on recent echocardiogram patient have a history of the hypertension obesity hyperlipidemia she symptomatic with short of breath recommend patient to continue with the Du continue with statin patient will follow-up with the Cardiology

## 2021-01-29 NOTE — ASSESSMENT & PLAN NOTE
Chronic asymptomatic fair control encouraged patient to watch for the low carb diet and important lose weight

## 2021-01-29 NOTE — ASSESSMENT & PLAN NOTE
A new diagnosis the short of breath on exertion even her daily activity recent echocardiogram it show her ejection fraction 70% and she has cardiomegaly  Patient was history of the hypertension and obesity and the hyperlipidemia I do recommend to the patient to be evaluated by Cardiology for further workup discussed important lose weight and InCase short of breath get worse to go the emergency room

## 2021-01-29 NOTE — ASSESSMENT & PLAN NOTE
Chronic asymptomatic fair control continue Du 5-40 mg once a day low-salt diet and important lose weight review with the patient

## 2021-02-20 DIAGNOSIS — E78.2 MIXED HYPERLIPIDEMIA: ICD-10-CM

## 2021-02-20 DIAGNOSIS — E03.9 ACQUIRED HYPOTHYROIDISM: ICD-10-CM

## 2021-02-21 RX ORDER — ATORVASTATIN CALCIUM 20 MG/1
TABLET, FILM COATED ORAL
Qty: 90 TABLET | Refills: 0 | Status: SHIPPED | OUTPATIENT
Start: 2021-02-21

## 2021-02-21 RX ORDER — LEVOTHYROXINE SODIUM 112 UG/1
TABLET ORAL
Qty: 90 TABLET | Refills: 0 | Status: SHIPPED | OUTPATIENT
Start: 2021-02-21

## 2021-03-28 DIAGNOSIS — F32.A DEPRESSIVE DISORDER: ICD-10-CM

## 2021-03-29 RX ORDER — CITALOPRAM 20 MG/1
TABLET ORAL
Qty: 90 TABLET | Refills: 0 | OUTPATIENT
Start: 2021-03-29

## 2021-04-25 ENCOUNTER — TELEPHONE (OUTPATIENT)
Dept: FAMILY MEDICINE CLINIC | Facility: CLINIC | Age: 61
End: 2021-04-25

## 2021-05-17 ENCOUNTER — RA CDI HCC (OUTPATIENT)
Dept: OTHER | Facility: HOSPITAL | Age: 61
End: 2021-05-17

## 2021-05-17 NOTE — PROGRESS NOTES
[place] [id number]  coding opportunities             Chart reviewed, (number of) suggestions sent to provider: 1           Patients insurance company: [employer] (Medicare Advantage and Commercial)     Visit status: Patient canceled the appointment     Provider never responded to [place] [id number]  coding request     [place] [address]  coding opportunities                      Patients insurance company: Medicare             F32 9 Depression: found on active problem list, Can Depression be further classified

## 2021-05-21 DIAGNOSIS — J30.89 SEASONAL ALLERGIC RHINITIS DUE TO OTHER ALLERGIC TRIGGER: ICD-10-CM

## 2021-05-21 RX ORDER — MONTELUKAST SODIUM 10 MG/1
TABLET ORAL
Qty: 90 TABLET | Refills: 0 | OUTPATIENT
Start: 2021-05-21

## 2021-09-14 NOTE — PROGRESS NOTES
[facility] - Outpatient Consultation  Wicho Gupta 62 y o  female MRN: [id number]  Encounter: [id number]      PCP: No primary care provider on file  Referring: Alesha Estes MD  [address]  [place], [place] [address]      ASSESSMENT AND PLAN:      1  Alkaline phosphatase elevation  2  Fatty liver disease, nonalcoholic      3  Bloating  4  Chronic idiopathic constipation  - recommend linzess      ______________________________________________________________________    CC:  Chief Complaint   Patient presents with    Elevated LFTs    Abnormal Imaging Result     US Abdomen Limited: Hepatomegaly with fatty infiltration    Abdominal Pain     Occasional     Abdominal Cramping       HPI:      Patient found to have elevated liver enzymes  Underwent ultrasound with hepatomegaly and fatty infiltrates and consistent with fatty liver disease  She describes abdominal pain and cramping related to constipation  REVIEW OF SYSTEMS:    CONSTITUTIONAL: Denies any fever, chills, rigors, and weight loss  HEENT: No earache or tinnitus  Denies hearing loss or visual disturbances  CARDIOVASCULAR: No chest pain or palpitations  RESPIRATORY: Denies any cough, hemoptysis, shortness of breath or dyspnea on exertion  GASTROINTESTINAL: As noted in the History of Present Illness  GENITOURINARY: No problems with urination  Denies any hematuria or dysuria  NEUROLOGIC: No dizziness or vertigo, denies headaches  MUSCULOSKELETAL: Denies any muscle or joint pain  SKIN: Denies skin rashes or itching  ENDOCRINE: Denies excessive thirst  Denies intolerance to heat or cold  PSYCHOSOCIAL: Denies depression or anxiety  Denies any recent memory loss         Historical Information   Past Medical History:   Diagnosis Date    Allergic rhinitis     Cardiac murmur     Depression     GERD (gastroesophageal reflux disease)     Hyperlipidemia     Hypertension     Hypothyroidism     IFG (impaired fasting glucose)     Obesity     Osteoarthritis of spine with radiculopathy, lumbosacral region 2019    Vitamin D deficiency      Past Surgical History:   Procedure Laterality Date     SECTION      3     Social History   Social History     Substance and Sexual Activity   Alcohol Use No     Social History     Substance and Sexual Activity   Drug Use No     Social History     Tobacco Use   Smoking Status Never Smoker   Smokeless Tobacco Never Used   Tobacco Comment    no passive smoke exposure     Family History   Problem Relation Age of Onset    Heart disease Father     Breast cancer Sister     Ovarian cancer Mother 80    No Known Problems Daughter     No Known Problems Maternal Grandmother     No Known Problems Paternal Grandmother     No Known Problems Sister     No Known Problems Sister     No Known Problems Sister     No Known Problems Daughter     No Known Problems Maternal Aunt     No Known Problems Maternal Aunt     No Known Problems Paternal Aunt     No Known Problems Paternal Aunt     No Known Problems Paternal Aunt     No Known Problems Maternal Grandfather     No Known Problems Paternal Grandfather        Meds/Allergies       Current Outpatient Medications:     fexofenadine (ALLEGRA ALLERGY) 180 MG tablet    fluticasone (FLONASE) 50 mcg/act nasal spray    atorvastatin (LIPITOR) 20 mg tablet    Du 5-40 MG    citalopram (CeleXA) 20 mg tablet    Diclofenac Sodium (Voltaren) 1 %    furosemide (LASIX) 20 mg tablet    hydrocortisone (ANUSOL-HC) 2 5 % rectal cream    ibuprofen (MOTRIN) 600 mg tablet    levothyroxine 112 mcg tablet    montelukast (SINGULAIR) 10 mg tablet    Allergies   Allergen Reactions    No Active Allergies            Objective     Blood pressure 124/76, pulse 78, temperature 98 °F (36 7 °C), temperature source Tympanic, height 5' 2 25" (1 581 m), weight 107 kg (236 lb), not currently breastfeeding  Body mass index is 42 82 kg/m²  PHYSICAL EXAM:      General Appearance:   Alert, cooperative, no distress   HEENT:   Normocephalic, atraumatic, anicteric  Neck:  Supple, symmetrical, trachea midline   Lungs:   Clear to auscultation bilaterally; no rales, rhonchi or wheezing; respirations unlabored    Heart[de-identified]   Regular rate and rhythm; no murmur, rub, or gallop  Abdomen:   Soft, non-tender, non-distended; normal bowel sounds; no masses, no organomegaly    Genitalia:   Deferred    Rectal:   Deferred    Extremities:  No cyanosis, clubbing or edema    Pulses:  2+ and symmetric    Skin:  No jaundice, rashes, or lesions    Lymph nodes:  No palpable cervical lymphadenopathy        Lab Results:     No results found for: WBC, HGB, HCT, MCV, PLT    No results found for: NA, K, CL, CO2, ANIONGAP, BUN, CREATININE, GLUCOSE, GLUF, CALCIUM, CORRECTEDCA, AST, ALT, ALKPHOS, PROT, BILITOT, EGFR    No results found for: INR, PROTIME      Radiology Results:   No results found  Portions of the record may have been created with voice recognition software  Occasional wrong word or "sound a like" substitutions may have occurred due to the inherent limitations of voice recognition software  Read the chart carefully and recognize, using context, where substitutions have occurred

## 2021-10-27 ENCOUNTER — VBI (OUTPATIENT)
Dept: ADMINISTRATIVE | Facility: OTHER | Age: 61
End: 2021-10-27

## 2021-12-20 ENCOUNTER — HOSPITAL ENCOUNTER (OUTPATIENT)
Dept: MAMMOGRAPHY | Facility: CLINIC | Age: 61
Discharge: HOME | End: 2021-12-20
Payer: COMMERCIAL

## 2021-12-20 VITALS — WEIGHT: 238 LBS | HEIGHT: 62 IN | BODY MASS INDEX: 43.79 KG/M2

## 2021-12-20 DIAGNOSIS — Z12.31 ENCOUNTER FOR SCREENING MAMMOGRAM FOR MALIGNANT NEOPLASM OF BREAST: ICD-10-CM

## 2021-12-20 PROCEDURE — 77063 BREAST TOMOSYNTHESIS BI: CPT

## 2021-12-20 PROCEDURE — 77067 SCR MAMMO BI INCL CAD: CPT
